# Patient Record
Sex: FEMALE | Race: WHITE | Employment: OTHER | URBAN - METROPOLITAN AREA
[De-identification: names, ages, dates, MRNs, and addresses within clinical notes are randomized per-mention and may not be internally consistent; named-entity substitution may affect disease eponyms.]

---

## 2017-02-07 ENCOUNTER — HOSPITAL ENCOUNTER (OUTPATIENT)
Dept: LAB | Age: 77
Discharge: HOME | End: 2017-02-07

## 2017-03-23 ENCOUNTER — HOSPITAL ENCOUNTER (OUTPATIENT)
Dept: LAB | Age: 77
Discharge: HOME | End: 2017-03-23

## 2020-07-31 ENCOUNTER — ED HISTORICAL/CONVERTED ENCOUNTER (OUTPATIENT)
Dept: OTHER | Age: 80
End: 2020-07-31

## 2021-05-21 ENCOUNTER — APPOINTMENT (OUTPATIENT)
Dept: CT IMAGING | Age: 81
End: 2021-05-21
Attending: EMERGENCY MEDICINE
Payer: MEDICARE

## 2021-05-21 ENCOUNTER — APPOINTMENT (OUTPATIENT)
Dept: GENERAL RADIOLOGY | Age: 81
End: 2021-05-21
Attending: EMERGENCY MEDICINE
Payer: MEDICARE

## 2021-05-21 ENCOUNTER — APPOINTMENT (OUTPATIENT)
Dept: CT IMAGING | Age: 81
DRG: 951 | End: 2021-05-21
Attending: PHYSICIAN ASSISTANT
Payer: MEDICARE

## 2021-05-21 ENCOUNTER — HOSPITAL ENCOUNTER (INPATIENT)
Age: 81
LOS: 1 days | Discharge: HOME | DRG: 951 | End: 2021-05-21
Attending: FAMILY MEDICINE | Admitting: FAMILY MEDICINE
Payer: MEDICARE

## 2021-05-21 ENCOUNTER — HOSPITAL ENCOUNTER (EMERGENCY)
Age: 81
Discharge: ANOTHER HOSPITAL | End: 2021-05-21
Attending: EMERGENCY MEDICINE
Payer: MEDICARE

## 2021-05-21 VITALS
SYSTOLIC BLOOD PRESSURE: 133 MMHG | WEIGHT: 167 LBS | HEART RATE: 75 BPM | HEIGHT: 61 IN | DIASTOLIC BLOOD PRESSURE: 50 MMHG | TEMPERATURE: 98.4 F | RESPIRATION RATE: 18 BRPM | BODY MASS INDEX: 31.53 KG/M2 | OXYGEN SATURATION: 95 %

## 2021-05-21 VITALS
DIASTOLIC BLOOD PRESSURE: 52 MMHG | RESPIRATION RATE: 18 BRPM | HEART RATE: 85 BPM | OXYGEN SATURATION: 95 % | SYSTOLIC BLOOD PRESSURE: 120 MMHG | TEMPERATURE: 98 F

## 2021-05-21 DIAGNOSIS — S50.11XA TRAUMATIC HEMATOMA OF RIGHT FOREARM, INITIAL ENCOUNTER: Primary | ICD-10-CM

## 2021-05-21 DIAGNOSIS — S06.5X0A SUBDURAL HEMATOMA WITHOUT COMA, WITHOUT LOSS OF CONSCIOUSNESS, INITIAL ENCOUNTER (HCC): ICD-10-CM

## 2021-05-21 DIAGNOSIS — S70.01XA HEMATOMA OF RIGHT HIP, INITIAL ENCOUNTER: ICD-10-CM
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
## 2021-05-21 PROBLEM — I62.00 SUBDURAL BLEEDING (HCC): Status: ACTIVE | Noted: 2021-05-21

## 2021-05-21 LAB
ALBUMIN SERPL-MCNC: 3.1 G/DL (ref 3.5–5)
ALBUMIN/GLOB SERPL: 0.8 {RATIO} (ref 1.1–2.2)
ALP SERPL-CCNC: 100 U/L (ref 45–117)
ALT SERPL-CCNC: 25 U/L (ref 12–78)
ANION GAP SERPL CALC-SCNC: 9 MMOL/L (ref 5–15)
APTT PPP: 22.9 SEC (ref 22.1–31)
AST SERPL W P-5'-P-CCNC: 18 U/L (ref 15–37)
BASOPHILS # BLD: 0 K/UL (ref 0–0.1)
BASOPHILS NFR BLD: 0 % (ref 0–1)
BILIRUB SERPL-MCNC: 0.1 MG/DL (ref 0.2–1)
BUN SERPL-MCNC: 30 MG/DL (ref 6–20)
BUN/CREAT SERPL: 25 (ref 12–20)
CA-I BLD-MCNC: 8.8 MG/DL (ref 8.5–10.1)
CHLORIDE SERPL-SCNC: 102 MMOL/L (ref 97–108)
CO2 SERPL-SCNC: 29 MMOL/L (ref 21–32)
CREAT SERPL-MCNC: 1.22 MG/DL (ref 0.55–1.02)
DIFFERENTIAL METHOD BLD: ABNORMAL
EOSINOPHIL # BLD: 0 K/UL (ref 0–0.4)
EOSINOPHIL NFR BLD: 0 % (ref 0–7)
ERYTHROCYTE [DISTWIDTH] IN BLOOD BY AUTOMATED COUNT: 13.9 % (ref 11.5–14.5)
GLOBULIN SER CALC-MCNC: 3.9 G/DL (ref 2–4)
GLUCOSE SERPL-MCNC: 134 MG/DL (ref 65–100)
HCT VFR BLD AUTO: 31.6 % (ref 35–47)
HGB BLD-MCNC: 10.3 G/DL (ref 11.5–16)
IMM GRANULOCYTES # BLD AUTO: 0.6 K/UL (ref 0–0.04)
IMM GRANULOCYTES NFR BLD AUTO: 3 % (ref 0–0.5)
INR PPP: 1 (ref 0.9–1.1)
LYMPHOCYTES # BLD: 1.6 K/UL (ref 0.8–3.5)
LYMPHOCYTES NFR BLD: 8 % (ref 12–49)
MCH RBC QN AUTO: 30 PG (ref 26–34)
MCHC RBC AUTO-ENTMCNC: 32.6 G/DL (ref 30–36.5)
MCV RBC AUTO: 92.1 FL (ref 80–99)
MONOCYTES # BLD: 0.9 K/UL (ref 0–1)
MONOCYTES NFR BLD: 5 % (ref 5–13)
NEUTS SEG # BLD: 17.2 K/UL (ref 1.8–8)
NEUTS SEG NFR BLD: 84 % (ref 32–75)
PLATELET # BLD AUTO: 285 K/UL (ref 150–400)
PMV BLD AUTO: 10.1 FL (ref 8.9–12.9)
POTASSIUM SERPL-SCNC: 4 MMOL/L (ref 3.5–5.1)
PROT SERPL-MCNC: 7 G/DL (ref 6.4–8.2)
PROTHROMBIN TIME: 10.2 SEC (ref 9–11.1)
RBC # BLD AUTO: 3.43 M/UL (ref 3.8–5.2)
SODIUM SERPL-SCNC: 140 MMOL/L (ref 136–145)
THERAPEUTIC RANGE,PTTT: NORMAL SEC (ref 82–109)
WBC # BLD AUTO: 20.3 K/UL (ref 3.6–11)

## 2021-05-21 PROCEDURE — 99284 EMERGENCY DEPT VISIT MOD MDM: CPT

## 2021-05-21 PROCEDURE — 97161 PT EVAL LOW COMPLEX 20 MIN: CPT

## 2021-05-21 PROCEDURE — 77030012341 HC CHMB SPCR OPTC MDI VYRM -A

## 2021-05-21 PROCEDURE — 97165 OT EVAL LOW COMPLEX 30 MIN: CPT

## 2021-05-21 PROCEDURE — 72125 CT NECK SPINE W/O DYE: CPT

## 2021-05-21 PROCEDURE — 74011636637 HC RX REV CODE- 636/637: Performed by: FAMILY MEDICINE

## 2021-05-21 PROCEDURE — 70450 CT HEAD/BRAIN W/O DYE: CPT

## 2021-05-21 PROCEDURE — 80053 COMPREHEN METABOLIC PANEL: CPT

## 2021-05-21 PROCEDURE — 74011000250 HC RX REV CODE- 250: Performed by: FAMILY MEDICINE

## 2021-05-21 PROCEDURE — 97530 THERAPEUTIC ACTIVITIES: CPT

## 2021-05-21 PROCEDURE — 65660000000 HC RM CCU STEPDOWN

## 2021-05-21 PROCEDURE — 85025 COMPLETE CBC W/AUTO DIFF WBC: CPT

## 2021-05-21 PROCEDURE — 36415 COLL VENOUS BLD VENIPUNCTURE: CPT

## 2021-05-21 PROCEDURE — 85610 PROTHROMBIN TIME: CPT

## 2021-05-21 PROCEDURE — 94640 AIRWAY INHALATION TREATMENT: CPT

## 2021-05-21 PROCEDURE — 73502 X-RAY EXAM HIP UNI 2-3 VIEWS: CPT

## 2021-05-21 PROCEDURE — 74011250637 HC RX REV CODE- 250/637: Performed by: FAMILY MEDICINE

## 2021-05-21 PROCEDURE — 73090 X-RAY EXAM OF FOREARM: CPT

## 2021-05-21 PROCEDURE — 85730 THROMBOPLASTIN TIME PARTIAL: CPT

## 2021-05-21 PROCEDURE — 97116 GAIT TRAINING THERAPY: CPT

## 2021-05-21 RX ORDER — HYDROCHLOROTHIAZIDE 25 MG/1
25 TABLET ORAL
Status: DISCONTINUED | OUTPATIENT
Start: 2021-05-21 | End: 2021-05-21 | Stop reason: HOSPADM

## 2021-05-21 RX ORDER — ALBUTEROL SULFATE 90 UG/1
2 AEROSOL, METERED RESPIRATORY (INHALATION)
Status: DISCONTINUED | OUTPATIENT
Start: 2021-05-21 | End: 2021-05-21 | Stop reason: CLARIF

## 2021-05-21 RX ORDER — ARFORMOTEROL TARTRATE 15 UG/2ML
15 SOLUTION RESPIRATORY (INHALATION)
Status: DISCONTINUED | OUTPATIENT
Start: 2021-05-21 | End: 2021-05-21 | Stop reason: HOSPADM

## 2021-05-21 RX ORDER — DOCUSATE SODIUM 100 MG/1
200 CAPSULE, LIQUID FILLED ORAL
Status: DISCONTINUED | OUTPATIENT
Start: 2021-05-21 | End: 2021-05-21 | Stop reason: HOSPADM

## 2021-05-21 RX ORDER — ALBUTEROL SULFATE 0.83 MG/ML
2.5 SOLUTION RESPIRATORY (INHALATION)
Status: DISCONTINUED | OUTPATIENT
Start: 2021-05-21 | End: 2021-05-21 | Stop reason: HOSPADM

## 2021-05-21 RX ORDER — BUDESONIDE 0.5 MG/2ML
500 INHALANT ORAL
Status: DISCONTINUED | OUTPATIENT
Start: 2021-05-21 | End: 2021-05-21 | Stop reason: HOSPADM

## 2021-05-21 RX ORDER — FLUTICASONE PROPIONATE 50 MCG
2 SPRAY, SUSPENSION (ML) NASAL 2 TIMES DAILY
Status: DISCONTINUED | OUTPATIENT
Start: 2021-05-21 | End: 2021-05-21 | Stop reason: HOSPADM

## 2021-05-21 RX ORDER — PREDNISONE 20 MG/1
40 TABLET ORAL
Status: DISCONTINUED | OUTPATIENT
Start: 2021-05-21 | End: 2021-05-21 | Stop reason: HOSPADM

## 2021-05-21 RX ORDER — GABAPENTIN 300 MG/1
300 CAPSULE ORAL
Status: DISCONTINUED | OUTPATIENT
Start: 2021-05-21 | End: 2021-05-21 | Stop reason: HOSPADM

## 2021-05-21 RX ORDER — FERROUS SULFATE, DRIED 160(50) MG
1 TABLET, EXTENDED RELEASE ORAL 2 TIMES DAILY WITH MEALS
Status: DISCONTINUED | OUTPATIENT
Start: 2021-05-21 | End: 2021-05-21 | Stop reason: HOSPADM

## 2021-05-21 RX ORDER — BENZONATATE 100 MG/1
200 CAPSULE ORAL
Status: DISCONTINUED | OUTPATIENT
Start: 2021-05-21 | End: 2021-05-21 | Stop reason: HOSPADM

## 2021-05-21 RX ORDER — LOSARTAN POTASSIUM 50 MG/1
100 TABLET ORAL
Status: DISCONTINUED | OUTPATIENT
Start: 2021-05-21 | End: 2021-05-21 | Stop reason: HOSPADM

## 2021-05-21 RX ORDER — IPRATROPIUM BROMIDE 0.5 MG/2.5ML
0.5 SOLUTION RESPIRATORY (INHALATION)
Status: DISCONTINUED | OUTPATIENT
Start: 2021-05-21 | End: 2021-05-21 | Stop reason: HOSPADM

## 2021-05-21 RX ORDER — PANTOPRAZOLE SODIUM 40 MG/1
40 TABLET, DELAYED RELEASE ORAL
Status: DISCONTINUED | OUTPATIENT
Start: 2021-05-21 | End: 2021-05-21 | Stop reason: HOSPADM

## 2021-05-21 RX ADMIN — GABAPENTIN 300 MG: 300 CAPSULE ORAL at 06:07

## 2021-05-21 RX ADMIN — IPRATROPIUM BROMIDE 0.5 MG: 0.5 SOLUTION RESPIRATORY (INHALATION) at 08:31

## 2021-05-21 RX ADMIN — FLUTICASONE PROPIONATE 2 SPRAY: 50 SPRAY, METERED NASAL at 14:24

## 2021-05-21 RX ADMIN — CALCIUM CARBONATE-VITAMIN D TAB 500 MG-200 UNIT 1 TABLET: 500-200 TAB at 08:49

## 2021-05-21 RX ADMIN — ARFORMOTEROL TARTRATE 15 MCG: 15 SOLUTION RESPIRATORY (INHALATION) at 08:31

## 2021-05-21 RX ADMIN — PREDNISONE 40 MG: 20 TABLET ORAL at 08:49

## 2021-05-21 RX ADMIN — PANTOPRAZOLE SODIUM 40 MG: 40 TABLET, DELAYED RELEASE ORAL at 06:07

## 2021-05-21 RX ADMIN — BUDESONIDE 500 MCG: 0.5 INHALANT RESPIRATORY (INHALATION) at 08:31

## 2021-05-21 NOTE — ED NOTES
Patient transported to [facility] via [facility]. Report, EMTALA, and copy of the chart given to paramedic. Patient displays no signs or symptoms of distress at time of leaving this facility.

## 2021-05-21 NOTE — H&P
Hospitalist Admission Note    NAME: Janel Terrell   :  1940   MRN:  [id number]     Date/Time:  2021 6:36 AM    Patient PCP: Uriel Smith MD  ________________________________________________________________________    My assessment of this patient's clinical condition and my plan of care is as follows. Assessment / Plan:  Patient 41-year-old female admitted for traumatic subdural hematoma  1. Subdural hematoma; patient neurologically intact, alert orientedx4, CT scan of head showsQuestion subtle anterior right parafalcine subdural hematoma. Otherwise, noadditional areas of acute intracranial hemorrhage or mass effect suspected. Neurosurgery consulted, advised admission repeat CT scan there is no indication for acute surgical intervention. 2.  History of asthma bronchiectasis recently diagnosed with bronchitis. On prednisone. We will continue prednisone we will continue inhalers. No respiratory distress at rest.  Minimal expiratory wheezing. 3.  History of lower extremity neuropathy: Continue gabapentin  4. Retention: Continue home medication  5. History of reflux; continue Protonix  History of Mycobacterium avium, no active infection, recently had follow-up with pulmonologist    Code Status: Full code  Surrogate Decision Maker:    DVT Prophylaxis: SCD  GI Prophylaxis: not indicated    Baseline: Independent lifestyle        Subjective:   CHIEF COMPLAINT: Fall, referral from [facility] emergency room for subdural hematoma    HISTORY OF PRESENT ILLNESS:     Geetha Peralta is a [de-identified] y.o.  female who presents with above symptoms.   Patient reports had a ground-level fall on her right side, with large bruise on right elbow and right hip, reports she was conscious during the whole process, reports she did not remember hitting her head, she went to emergency room because she had a bruise on right hip, and was concerned about hip fracture, reports she was able to ambulate after fall, denies chest pain shortness of breath dizziness prior to fall, denies recurrent fall. Patient reports has history of asthma and bronchiectasis, never been prescribed prednisone tapering dose. Symptoms improving. Denies shortness of breath at rest  Upon arrival in the emergency room CT scan of cervical spine shows no acute fracture dislocation, CT scan of head shows questionable anterior right parafalcine  subdural hematoma, consulted neurosurgery on-call advised admission for further evaluation repeat CT .     Past Medical History:   Diagnosis Date    Arthritis     hands    Asthma     Bronchiectasis (HCC)     Chronic pain     back (pt denies)    GERD (gastroesophageal reflux disease)     Hypertension     MAC (mycobacterium avium-intracellulare complex)     Other ill-defined conditions(799.89)     MAC- mycobacterium avium complex    Other ill-defined conditions(799.89)     restless leg syndrome    Other ill-defined conditions(799.89)     bronchiectasis    Psychiatric disorder     anxiety    Unspecified sleep apnea     wears CPAP at night        Past Surgical History:   Procedure Laterality Date    HX APPENDECTOMY      HX CHOLECYSTECTOMY      HX HYSTERECTOMY  2002    HX MOHS PROCEDURES Right     HX ORTHOPAEDIC Left 2002    hemiarthrectomy    HX ORTHOPAEDIC Right     bunionectomy, hammer toe repair    HX ORTHOPAEDIC Left     3 shoulder surgeries - last was a reverse shoulder replacement    HX TONSILLECTOMY      [place] BREAST SURGERY PROCEDURE UNLISTED      breast reductions       Social History     Tobacco Use    Smoking status: Never Smoker    Smokeless tobacco: Never Used   Substance Use Topics    Alcohol use: No        Family History   Problem Relation Age of Onset    Stroke Mother     Lung Disease Father     Cancer Sister         bladder    Arthritis-osteo Sister     Arthritis-osteo Sister     Hypertension Sister     Heart Disease Sister     Other Brother         dies with complications with cholecystectomy     Allergies   Allergen Reactions    Latex Other (comments)     Flu like symptoms    Clindamycin Nausea Only    Darvocet A500 [Propoxyphene N-Acetaminophen] Unknown (comments)     Not sure patient states it was on her 'chart'    Penicillins Rash     Not really sure patient had bruises at the time    Reglan [Metoclopramide] Other (comments)     Made patient feel like a 'zombie'        Prior to Admission medications    Medication Sig Start Date End Date Taking? Authorizing Provider   oxyCODONE-acetaminophen (PERCOCET) 5-325 mg per tablet Take 1 Tab by mouth every four (4) hours as needed for Pain. Max Daily Amount: 6 Tabs. 4/4/16   Pamela Britton MD   ondansetron (ZOFRAN ODT) 4 mg disintegrating tablet Take 1 Tab by mouth every eight (8) hours as needed for Nausea. 4/4/16   Pamela Britton MD   polyethylene glycol (MIRALAX) 17 gram/dose powder Take 17 g by mouth daily for 30 days. 2/23/15 3/25/15  Sandy Estrada MD   arformoterol (BROVANA) 15 mcg/2 mL nebu neb solution 15 mcg by Nebulization route two (2) times a day. Provider, Historical   budesonide (PULMICORT) 0.5 mg/2 mL nebulizer suspension 500 mcg by Nebulization route two (2) times a day. Provider, Historical   ipratropium (ATROVENT) 0.02 % nebulizer solution by Nebulization route two (2) times a day. Can use every 4 hrs as needed but does use it twice daily. Provider, Historical   lansoprazole (PREVACID) 30 mg capsule Take 30 mg by mouth two (2) times a day. Provider, Historical   potassium chloride (KLOR-CON M20) 20 mEq tablet Take 40 mEq by mouth two (2) times a day. Provider, Historical   montelukast (SINGULAIR) 10 mg tablet Take 10 mg by mouth daily. Provider, Historical   tiotropium (SPIRIVA WITH HANDIHALER) 18 mcg inhalation capsule Take 1 Cap by inhalation daily. Provider, Historical   VENLAFAXINE HCL (EFFEXOR XR PO) Take 150 mg by mouth daily.     Provider, Historical   rOPINIRole (REQUIP) 0.5 mg tablet Take  by mouth. 1 tab in am, one tab in the middle of the day, and 3 tabs at night. Provider, Historical   VERAPAMIL HCL (VERAPAMIL PO) Take 180 mg by mouth daily. Provider, Historical   traZODone (DESYREL) 100 mg tablet Take 100 mg by mouth nightly. Provider, Historical   albuterol (PROAIR HFA) 90 mcg/actuation inhaler Take 2 Puffs by inhalation every four (4) hours as needed for Wheezing. Provider, Historical   losartan-hydrochlorothiazide (HYZAAR) 100-25 mg per tablet Take 1 Tab by mouth nightly. Provider, Historical   cetirizine (ZYRTEC) 10 mg tablet Take 10 mg by mouth daily. Provider, Historical   gabapentin (NEURONTIN) 300 mg capsule Take 300 mg by mouth nightly. Provider, Historical   furosemide (LASIX) 40 mg tablet Take 40 mg by mouth daily as needed. Provider, Historical   fluticasone (FLONASE) 50 mcg/actuation nasal spray 2 Sprays by Both Nostrils route two (2) times a day. Provider, Historical   benzonatate (TESSALON) 200 mg capsule Take 200 mg by mouth three (3) times daily as needed for Cough. Provider, Historical   cephALEXin (KEFLEX) 500 mg capsule Take  by mouth. 4  prior to dental appt. Provider, Historical   docusate sodium (STOOL SOFTENER) 100 mg capsule Take 200 mg by mouth nightly. Provider, Historical   calcium citrate-vitamin d3 (CITRACAL + D) 315-200 mg-unit tab Take 2 Tabs by mouth two (2) times daily (with meals). Provider, Historical       REVIEW OF SYSTEMS:     I am not able to complete the review of systems because:    The patient is intubated and sedated    The patient has altered mental status due to his acute medical problems    The patient has baseline aphasia from prior stroke(s)    The patient has baseline dementia and is not reliable historian    The patient is in acute medical distress and unable to provide information           Total of 12 systems reviewed as follows:       POSITIVE= underlined text  Negative = text not underlined  General:  fever, chills, sweats, generalized weakness, weight loss/gain,      loss of appetite   Eyes:    blurred vision, eye pain, loss of vision, double vision  ENT:    rhinorrhea, pharyngitis   Respiratory:   cough, sputum production, SOB, ROPER, wheezing, pleuritic pain   Cardiology:   chest pain, palpitations, orthopnea, PND, edema, syncope   Gastrointestinal:  abdominal pain , N/V, diarrhea, dysphagia, constipation, bleeding   Genitourinary:  frequency, urgency, dysuria, hematuria, incontinence   Muskuloskeletal : Right elbow right hip pain  Hematology:  easy bruising, nose or gum bleeding, lymphadenopathy   Dermatological: Right elbow, right hip, multiple bruises,   Endocrine:   hot flashes or polydipsia   Neurological:  headache, dizziness, confusion, focal weakness, paresthesia,     Speech difficulties, memory loss, gait difficulty  Psychological: Feelings of anxiety, depression, agitation    Objective:   VITALS:    Visit Vitals  /60 (BP 1 Location: Left arm)   Pulse 84   Temp 97.7 °F (36.5 °C)   Resp 16   SpO2 95%       PHYSICAL EXAM:    General:    Alert, cooperative, no distress, appears stated age. HEENT: A small bruise on chin, anicteric sclerae, pink conjunctivae     No oral ulcers, mucosa moist, throat clear, dentition fair  Neck:  Supple, symmetrical,  thyroid: non tender  Lungs:   Clear to auscultation bilaterally. No Wheezing or Rhonchi. No rales. Chest wall:  No tenderness  No Accessory muscle use. Heart:   Regular  rhythm,  No  murmur   No edema  Abdomen:   Soft, non-tender. Not distended. Bowel sounds normal  Extremities: Bruises on right elbow and right hip    Capillary refill normal,  Radial pulse 2+,  DP. Skin:     Not pale. Not Jaundiced  No rashes   Psych:  Good insight. Not depressed. Not anxious or agitated. Neurologic: EOMs intact. No facial asymmetry. No aphasia or slurred speech. Symmetrical strength, Sensation grossly intact. Alert and oriented X 4. _______________________________________________________________________  Care Plan discussed with:    Comments   Patient     Family      RN     Care Manager                    Consultant:      _______________________________________________________________________  Expected  Disposition:   Home with Family    HH/PT/OT/RN    SNF/LTC    CASTRO    ________________________________________________________________________  TOTAL TIME: 45Minutes    Critical Care Provided     Minutes non procedure based      Comments   >50% of visit spent in counseling and coordination of care  Chart review  Discussion with patient and/or family and questions answered     ________________________________________________________________________  Quintin Hoyos MD    Procedures: see electronic medical records for all procedures/Xrays and details which were not copied into this note but were reviewed prior to creation of Plan. LAB DATA REVIEWED:    Recent Results (from the past 24 hour(s))   CBC WITH AUTOMATED DIFF    Collection Time: 05/21/21  2:00 AM   Result Value Ref Range    WBC 20.3 (H) 3.6 - 11.0 K/uL    RBC 3.43 (L) 3.80 - 5.20 M/uL    HGB 10.3 (L) 11.5 - 16.0 g/dL    HCT 31.6 (L) 35.0 - 47.0 %    MCV 92.1 80.0 - 99.0 FL    MCH 30.0 26.0 - 34.0 PG    MCHC 32.6 30.0 - 36.5 g/dL    RDW 13.9 11.5 - 14.5 %    PLATELET 980 150 - 202 K/uL    MPV 10.1 8.9 - 12.9 FL    NEUTROPHILS 84 (H) 32 - 75 %    LYMPHOCYTES 8 (L) 12 - 49 %    MONOCYTES 5 5 - 13 %    EOSINOPHILS 0 0 - 7 %    BASOPHILS 0 0 - 1 %    IMMATURE GRANULOCYTES 3 (H) 0.0 - 0.5 %    ABS. NEUTROPHILS 17.2 (H) 1.8 - 8.0 K/UL    ABS. LYMPHOCYTES 1.6 0.8 - 3.5 K/UL    ABS. MONOCYTES 0.9 0.0 - 1.0 K/UL    ABS. EOSINOPHILS 0.0 0.0 - 0.4 K/UL    ABS. BASOPHILS 0.0 0.0 - 0.1 K/UL    ABS. IMM.  GRANS. 0.6 (H) 0.00 - 0.04 K/UL    DF AUTOMATED     PROTHROMBIN TIME + INR    Collection Time: 05/21/21  2:00 AM   Result Value Ref Range    Prothrombin time 10.2 9.0 - 11.1 sec    INR 1.0 0.9 - 1. 1     PTT    Collection Time: 05/21/21  2:00 AM   Result Value Ref Range    aPTT 22.9 22.1 - 31.0 sec    aPTT, therapeutic range   82 - 394 sec   METABOLIC PANEL, COMPREHENSIVE    Collection Time: 05/21/21  2:00 AM   Result Value Ref Range    Sodium 140 136 - 145 mmol/L    Potassium 4.0 3.5 - 5.1 mmol/L    Chloride 102 97 - 108 mmol/L    CO2 29 21 - 32 mmol/L    Anion gap 9 5 - 15 mmol/L    Glucose 134 (H) 65 - 100 mg/dL    BUN 30 (H) 6 - 20 mg/dL    Creatinine 1.22 (H) 0.55 - 1.02 mg/dL    BUN/Creatinine ratio 25 (H) 12 - 20      GFR est AA 51 (L) >60 ml/min/1.73m2    GFR est non-AA 42 (L) >60 ml/min/1.73m2    Calcium 8.8 8.5 - 10.1 mg/dL    Bilirubin, total 0.1 (L) 0.2 - 1.0 mg/dL    AST (SGOT) 18 15 - 37 U/L    ALT (SGPT) 25 12 - 78 U/L    Alk.  phosphatase 100 45 - 117 U/L    Protein, total 7.0 6.4 - 8.2 g/dL    Albumin 3.1 (L) 3.5 - 5.0 g/dL    Globulin 3.9 2.0 - 4.0 g/dL    A-G Ratio 0.8 (L) 1.1 - 2.2

## 2021-05-21 NOTE — PROGRESS NOTES
Hospital follow-up PCP transitional care appointment has been scheduled with Dr. Antonio Dale for Tuesday, 6/8/21 at 12:40 p.m. Pending patient discharge.   Santino Cunningham, Care Management Specialist.

## 2021-05-21 NOTE — PROGRESS NOTES
Repeat head CT shows \"No acute intracranial hemorrhage, mass or infarct\". No neurosurgical needs at this time. Will sign off. Please re-consult if needed. Thank you.     SARAH Montenegro   Neurosurgery

## 2021-05-21 NOTE — ED PROVIDER NOTES
EMERGENCY DEPARTMENT HISTORY AND PHYSICAL EXAM      Date: 5/21/2021  Patient Name: Fidelina Mandel    History of Presenting Illness     Chief Complaint   Patient presents with    Fall       History Provided By: Patient    HPI: Fidelina Mandel, [de-identified] y.o. female with a past medical history significant for hypertension, osteoarthritis, asthma and Sleep apnea, GERD, psychiatric disorder, chronic pain, bronchiectasis, Mycobacterium avium-intracellulare complex (MAC) presents to the ED with cc of fall, who presents for complaints of right hip and right forearm pain and right side of chin with hematoma formation. She states she did not pass out, but developed significant swelling of her right hip and her right forearm at site of injury. She also complains of a mild headache and mild neck pain. No open lacerations reported or other injuries. There are no other complaints, changes, or physical findings at this time. PCP: Gloria Martinez MD    No current facility-administered medications on file prior to encounter. Current Outpatient Medications on File Prior to Encounter   Medication Sig Dispense Refill    oxyCODONE-acetaminophen (PERCOCET) 5-325 mg per tablet Take 1 Tab by mouth every four (4) hours as needed for Pain. Max Daily Amount: 6 Tabs. 20 Tab 0    ondansetron (ZOFRAN ODT) 4 mg disintegrating tablet Take 1 Tab by mouth every eight (8) hours as needed for Nausea. 20 Tab 0    polyethylene glycol (MIRALAX) 17 gram/dose powder Take 17 g by mouth daily for 30 days. 510 g 11    arformoterol (BROVANA) 15 mcg/2 mL nebu neb solution 15 mcg by Nebulization route two (2) times a day.  budesonide (PULMICORT) 0.5 mg/2 mL nebulizer suspension 500 mcg by Nebulization route two (2) times a day.  ipratropium (ATROVENT) 0.02 % nebulizer solution by Nebulization route two (2) times a day. Can use every 4 hrs as needed but does use it twice daily.       lansoprazole (PREVACID) 30 mg capsule Take 30 mg by mouth two (2) times a day.  potassium chloride (KLOR-CON M20) 20 mEq tablet Take 40 mEq by mouth two (2) times a day.  montelukast (SINGULAIR) 10 mg tablet Take 10 mg by mouth daily.  tiotropium (SPIRIVA WITH HANDIHALER) 18 mcg inhalation capsule Take 1 Cap by inhalation daily.  VENLAFAXINE HCL (EFFEXOR XR PO) Take 150 mg by mouth daily.  rOPINIRole (REQUIP) 0.5 mg tablet Take  by mouth. 1 tab in am, one tab in the middle of the day, and 3 tabs at night.  VERAPAMIL HCL (VERAPAMIL PO) Take 180 mg by mouth daily.  traZODone (DESYREL) 100 mg tablet Take 100 mg by mouth nightly.  albuterol (PROAIR HFA) 90 mcg/actuation inhaler Take 2 Puffs by inhalation every four (4) hours as needed for Wheezing.  losartan-hydrochlorothiazide (HYZAAR) 100-25 mg per tablet Take 1 Tab by mouth nightly.  cetirizine (ZYRTEC) 10 mg tablet Take 10 mg by mouth daily.  gabapentin (NEURONTIN) 300 mg capsule Take 300 mg by mouth nightly.  furosemide (LASIX) 40 mg tablet Take 40 mg by mouth daily as needed.  fluticasone (FLONASE) 50 mcg/actuation nasal spray 2 Sprays by Both Nostrils route two (2) times a day.  benzonatate (TESSALON) 200 mg capsule Take 200 mg by mouth three (3) times daily as needed for Cough.  cephALEXin (KEFLEX) 500 mg capsule Take  by mouth. 4  prior to dental appt.  docusate sodium (STOOL SOFTENER) 100 mg capsule Take 200 mg by mouth nightly.  calcium citrate-vitamin d3 (CITRACAL + D) 315-200 mg-unit tab Take 2 Tabs by mouth two (2) times daily (with meals).          Past History     Past Medical History:  Past Medical History:   Diagnosis Date    Arthritis     hands    Asthma     Bronchiectasis (HCC)     Chronic pain     back (pt denies)    GERD (gastroesophageal reflux disease)     Hypertension     MAC (mycobacterium avium-intracellulare complex)     Other ill-defined conditions(799.89)     MAC- mycobacterium avium complex    Other ill-defined conditions(799.89)     restless leg syndrome    Other ill-defined conditions(799.89)     bronchiectasis    Psychiatric disorder     anxiety    Unspecified sleep apnea     wears CPAP at night       Past Surgical History:  Past Surgical History:   Procedure Laterality Date    HX APPENDECTOMY      HX CHOLECYSTECTOMY      HX HYSTERECTOMY  2002    HX MOHS PROCEDURES Right     HX ORTHOPAEDIC Left 2002    hemiarthrectomy    HX ORTHOPAEDIC Right     bunionectomy, hammer toe repair    HX ORTHOPAEDIC Left     3 shoulder surgeries - last was a reverse shoulder replacement    HX TONSILLECTOMY      [place] BREAST SURGERY PROCEDURE UNLISTED      breast reductions       Family History:  Family History   Problem Relation Age of Onset    Stroke Mother     Lung Disease Father     Cancer Sister         bladder    Arthritis-osteo Sister    Dhiraj Clementon Sister     Hypertension Sister     Heart Disease Sister     Other Brother         dies with complications with cholecystectomy       Social History:  Social History     Tobacco Use    Smoking status: Never Smoker    Smokeless tobacco: Never Used   Substance Use Topics    Alcohol use: No    Drug use: No       Allergies: Allergies   Allergen Reactions    Latex Other (comments)     Flu like symptoms    Clindamycin Nausea Only    Darvocet A500 [Propoxyphene N-Acetaminophen] Unknown (comments)     Not sure patient states it was on her 'chart'    Penicillins Rash     Not really sure patient had bruises at the time    Reglan [Metoclopramide] Other (comments)     Made patient feel like a 'zombie'         Review of Systems     Review of Systems   Constitutional: Negative for diaphoresis and fatigue. HENT: Negative for congestion, dental problem, ear discharge and ear pain. Contusion on chin   Eyes: Negative for discharge and redness. Respiratory: Negative for cough, chest tightness and shortness of breath.     Cardiovascular: Negative for chest pain and palpitations. Gastrointestinal: Negative for abdominal pain, constipation, diarrhea, nausea and vomiting. Endocrine: Negative. Genitourinary: Negative. Negative for dysuria and frequency. Musculoskeletal: Negative for arthralgias and myalgias. As in HPI   Skin: Negative. Neurological: Positive for headaches. Negative for dizziness, syncope and light-headedness. Hematological: Negative. Psychiatric/Behavioral: Negative for agitation and behavioral problems. All other systems reviewed and are negative. Physical Exam     Physical Exam  Vitals and nursing note reviewed. Constitutional:       Appearance: Normal appearance. She is normal weight. HENT:      Head: Normocephalic and atraumatic. Nose: Nose normal.      Mouth/Throat:      Mouth: Mucous membranes are moist.      Pharynx: Oropharynx is clear. Eyes:      Extraocular Movements: Extraocular movements intact. Conjunctiva/sclera: Conjunctivae normal.      Pupils: Pupils are equal, round, and reactive to light. Cardiovascular:      Rate and Rhythm: Normal rate and regular rhythm. Pulses: Normal pulses. Heart sounds: Normal heart sounds. Pulmonary:      Effort: Pulmonary effort is normal.      Breath sounds: Normal breath sounds. Abdominal:      General: Abdomen is flat. Palpations: Abdomen is soft. Musculoskeletal:         General: Normal range of motion. Cervical back: Normal range of motion and neck supple. Skin:     General: Skin is warm and dry. Capillary Refill: Capillary refill takes less than 2 seconds. Findings: Bruising present. Comments: Right forearm and right hip   Neurological:      General: No focal deficit present. Mental Status: She is alert and oriented to person, place, and time.    Psychiatric:         Mood and Affect: Mood normal.         Behavior: Behavior normal.         Lab and Diagnostic Study Results     Labs -     Recent Results (from the past 12 hour(s))   CBC WITH AUTOMATED DIFF    Collection Time: 05/21/21  2:00 AM   Result Value Ref Range    WBC 20.3 (H) 3.6 - 11.0 K/uL    RBC 3.43 (L) 3.80 - 5.20 M/uL    HGB 10.3 (L) 11.5 - 16.0 g/dL    HCT 31.6 (L) 35.0 - 47.0 %    MCV 92.1 80.0 - 99.0 FL    MCH 30.0 26.0 - 34.0 PG    MCHC 32.6 30.0 - 36.5 g/dL    RDW 13.9 11.5 - 14.5 %    PLATELET 319 511 - 941 K/uL    MPV 10.1 8.9 - 12.9 FL    NEUTROPHILS 84 (H) 32 - 75 %    LYMPHOCYTES 8 (L) 12 - 49 %    MONOCYTES 5 5 - 13 %    EOSINOPHILS 0 0 - 7 %    BASOPHILS 0 0 - 1 %    IMMATURE GRANULOCYTES 3 (H) 0.0 - 0.5 %    ABS. NEUTROPHILS 17.2 (H) 1.8 - 8.0 K/UL    ABS. LYMPHOCYTES 1.6 0.8 - 3.5 K/UL    ABS. MONOCYTES 0.9 0.0 - 1.0 K/UL    ABS. EOSINOPHILS 0.0 0.0 - 0.4 K/UL    ABS. BASOPHILS 0.0 0.0 - 0.1 K/UL    ABS. IMM. GRANS. 0.6 (H) 0.00 - 0.04 K/UL    DF AUTOMATED     PROTHROMBIN TIME + INR    Collection Time: 05/21/21  2:00 AM   Result Value Ref Range    Prothrombin time 10.2 9.0 - 11.1 sec    INR 1.0 0.9 - 1.1     PTT    Collection Time: 05/21/21  2:00 AM   Result Value Ref Range    aPTT 22.9 22.1 - 31.0 sec    aPTT, therapeutic range   82 - 260 sec   METABOLIC PANEL, COMPREHENSIVE    Collection Time: 05/21/21  2:00 AM   Result Value Ref Range    Sodium 140 136 - 145 mmol/L    Potassium 4.0 3.5 - 5.1 mmol/L    Chloride 102 97 - 108 mmol/L    CO2 29 21 - 32 mmol/L    Anion gap 9 5 - 15 mmol/L    Glucose 134 (H) 65 - 100 mg/dL    BUN 30 (H) 6 - 20 mg/dL    Creatinine 1.22 (H) 0.55 - 1.02 mg/dL    BUN/Creatinine ratio 25 (H) 12 - 20      GFR est AA 51 (L) >60 ml/min/1.73m2    GFR est non-AA 42 (L) >60 ml/min/1.73m2    Calcium 8.8 8.5 - 10.1 mg/dL    Bilirubin, total 0.1 (L) 0.2 - 1.0 mg/dL    AST (SGOT) 18 15 - 37 U/L    ALT (SGPT) 25 12 - 78 U/L    Alk.  phosphatase 100 45 - 117 U/L    Protein, total 7.0 6.4 - 8.2 g/dL    Albumin 3.1 (L) 3.5 - 5.0 g/dL    Globulin 3.9 2.0 - 4.0 g/dL    A-G Ratio 0.8 (L) 1.1 - 2.2         Radiologic Studies -     CT Results  (Last 48 hours)               05/21/21 0244  CT SPINE CERV WO CONT Final result    Impression:  No acute fracture or subluxation. Narrative:  CT OF THE CERVICAL SPINE WITHOUT INTRAVENOUS CONTRAST       INDICATION: Head injury       TECHNIQUE: Thin section transaxial images were obtained through the cervical   spine. Sagittal and coronal reformations were performed. Dose reduction: Per   department policy, all CT scans at this facility are performed using dose   reduction optimization techniques as appropriate to a performed examination   including the following: Automated exposure control, adjustments of the mA   and/or KV according to patient size, or use of iterative reconstruction   technique. COMPARISON: None       FINDINGS:       Vertebral column: Vertebral body heights are normal. Intervertebral disc height   loss and degenerative endplate changes at E8-8 and C6-7. No acute fracture or   subluxation. No aggressive osseous lesions. Significant facet arthrosis on the   left at C2-3 and C3-4. Cystic changes are likely degenerative. Soft tissues: No prevertebral soft tissue swelling. Calcified atherosclerosis in   the neck. Partially visualized changes of endoscopic sinonasal surgery. Mild polypoid   mucosal thickening of the left maxillary sinus. Odontogenic disease with dental   hardware. 05/21/21 0100  CT HEAD WO CONT Final result    Impression:  Question subtle anterior right parafalcine subdural hematoma. Otherwise, no   additional areas of acute intracranial hemorrhage or mass effect suspected. Findings discussed with Dr. Ginny Cast by Dr. Padmini Jordan on 5/21/2021 at 1:18 AM               Narrative:  CT HEAD WITHOUT IV CONTRAST       CLINICAL INDICATION: Fall, injury       TECHNIQUE: Routine axial images were obtained through the brain without the use   of IV contrast. Sagittal and coronal reformatted images were performed at the CT   console.  Dose reduction: Per department policy, all CT scans at this facility   are performed using dose reduction optimization techniques as appropriate to a   performed examination including the following: Automated exposure control,   adjustments of the mA and/or KV according to patient size, or use of iterative   reconstruction technique. COMPARISON: None. FINDINGS:    There is trace high density along the anterior falx (axial image 40, coronal   image 24) which may represent subtle right parafalcine subdural hematoma. No   significant associated mass effect. No additional areas of acute intracranial   hemorrhage or mass effect. Ventricles and extra-axial spaces are normal in size   and configuration for patient age. No hydrocephalus. Sequela of prior endoscopic   sinonasal surgery noted. Mild mucosal thickening of the left maxillary sinus,   left frontal sinus and ethmoids. Bilateral TMJ arthrosis, right greater than   left. Tympanomastoid cavities are predominantly clear. Calcified intracranial   atherosclerosis. Absent bilateral native lenses. No acute calvarial abnormality. CXR Results  (Last 48 hours)    None            Medical Decision Making   - I am the first provider for this patient. - I reviewed the vital signs, available nursing notes, past medical history, past surgical history, family history and social history. - Initial assessment performed. The patients presenting problems have been discussed, and they are in agreement with the care plan formulated and outlined with them. I have encouraged them to ask questions as they arise throughout their visit. Vital Signs-Reviewed the patient's vital signs.   Patient Vitals for the past 12 hrs:   Temp Pulse Resp BP SpO2   05/21/21 0259  75 18 (!) 133/50 95 %   05/21/21 0020 98.4 °F (36.9 °C) 83 18 (!) 158/55 97 %       Records Reviewed: Nursing Notes    ED Course/Provider Notes (Medical Decision Making):   Patient will be transferred after discussion with Dr. Cintia Steen Ronald Yang, to [facility] for further evaluation and management and neurosurgical evaluation and treatment/observation. Disposition     Disposition: Condition stable    Patient will be transferred to [facility] for further evaluation and treatment. Accepted by Dr. Suresh Reyes (hospitalist) and neurosurgeon Dr. Clementina Murphy at [id number] [facility]:  1. Current Discharge Medication List      CONTINUE these medications which have NOT CHANGED    Details   oxyCODONE-acetaminophen (PERCOCET) 5-325 mg per tablet Take 1 Tab by mouth every four (4) hours as needed for Pain. Max Daily Amount: 6 Tabs. Qty: 20 Tab, Refills: 0      ondansetron (ZOFRAN ODT) 4 mg disintegrating tablet Take 1 Tab by mouth every eight (8) hours as needed for Nausea. Qty: 20 Tab, Refills: 0      polyethylene glycol (MIRALAX) 17 gram/dose powder Take 17 g by mouth daily for 30 days. Qty: 510 g, Refills: 11      arformoterol (BROVANA) 15 mcg/2 mL nebu neb solution 15 mcg by Nebulization route two (2) times a day. budesonide (PULMICORT) 0.5 mg/2 mL nebulizer suspension 500 mcg by Nebulization route two (2) times a day. ipratropium (ATROVENT) 0.02 % nebulizer solution by Nebulization route two (2) times a day. Can use every 4 hrs as needed but does use it twice daily. lansoprazole (PREVACID) 30 mg capsule Take 30 mg by mouth two (2) times a day. potassium chloride (KLOR-CON M20) 20 mEq tablet Take 40 mEq by mouth two (2) times a day. montelukast (SINGULAIR) 10 mg tablet Take 10 mg by mouth daily. tiotropium (SPIRIVA WITH HANDIHALER) 18 mcg inhalation capsule Take 1 Cap by inhalation daily. VENLAFAXINE HCL (EFFEXOR XR PO) Take 150 mg by mouth daily. rOPINIRole (REQUIP) 0.5 mg tablet Take  by mouth. 1 tab in am, one tab in the middle of the day, and 3 tabs at night. VERAPAMIL HCL (VERAPAMIL PO) Take 180 mg by mouth daily. traZODone (DESYREL) 100 mg tablet Take 100 mg by mouth nightly.       albuterol (PROAIR HFA) 90 mcg/actuation inhaler Take 2 Puffs by inhalation every four (4) hours as needed for Wheezing. losartan-hydrochlorothiazide (HYZAAR) 100-25 mg per tablet Take 1 Tab by mouth nightly. cetirizine (ZYRTEC) 10 mg tablet Take 10 mg by mouth daily. gabapentin (NEURONTIN) 300 mg capsule Take 300 mg by mouth nightly. furosemide (LASIX) 40 mg tablet Take 40 mg by mouth daily as needed. fluticasone (FLONASE) 50 mcg/actuation nasal spray 2 Sprays by Both Nostrils route two (2) times a day. benzonatate (TESSALON) 200 mg capsule Take 200 mg by mouth three (3) times daily as needed for Cough. cephALEXin (KEFLEX) 500 mg capsule Take  by mouth. 4  prior to dental appt. docusate sodium (STOOL SOFTENER) 100 mg capsule Take 200 mg by mouth nightly. calcium citrate-vitamin d3 (CITRACAL + D) 315-200 mg-unit tab Take 2 Tabs by mouth two (2) times daily (with meals). 2.   Follow-up Information    None       3. Return to ED if worse   4. Current Discharge Medication List            Diagnosis     Clinical Impression:   1. Traumatic hematoma of right forearm, initial encounter    2. Hematoma of right hip, initial encounter    3. Subdural hematoma without coma, without loss of consciousness, initial encounter [facility])        Attestations:    Delmer Jordan MD    Please note that this dictation was completed with [employer], the computer voice recognition software. Quite often unanticipated grammatical, syntax, homophones, and other interpretive errors are inadvertently transcribed by the computer software. Please disregard these errors. Please excuse any errors that have escaped final proofreading. Thank you.

## 2021-05-21 NOTE — PROGRESS NOTES
Problem: TIA/CVA Stroke: 0-24 hours  Goal: Activity/Safety  Outcome: Progressing Towards Goal  Goal: Consults, if ordered  Outcome: Progressing Towards Goal  Goal: Diagnostic Test/Procedures  Outcome: Progressing Towards Goal  Goal: Nutrition/Diet  Outcome: Progressing Towards Goal  Goal: Respiratory  Outcome: Progressing Towards Goal

## 2021-05-21 NOTE — ED TRIAGE NOTES
Patient reports she fell at home. States she was \"putting a glass on the counter and next thing I knew I was on the floor\". Denies syncopal episode. Denies LOC. Presents with bruising and pain to right forearm, right hip, and right side of chin. Also reports headache. Reports mild neck pain.  Reports incident happened around 2300

## 2021-05-21 NOTE — PROGRESS NOTES
OCCUPATIONAL THERAPY EVALUATION/DISCHARGE  Patient: Maria Luisa Mclaughlin ([de-identified] y.o. female)  Date: 5/21/2021  Primary Diagnosis: Subdural bleeding (HCC) [I62.00]       Precautions:        ASSESSMENT  Based on the objective data described below, the patient presents with elevated HR during activity. Education completed. Current Level of Function (ADLs/self-care): independent with ADLs    Other factors to consider for discharge: spouse     PLAN :  Recommend with staff: continue toileting in bathroom and completing ADLs    Recommendation for discharge: (in order for the patient to meet his/her long term goals)  No skilled occupational therapy/ follow up rehabilitation needs identified at this time. This discharge recommendation:  Has not yet been discussed the attending provider and/or case management    IF patient discharges home will need the following DME: none       SUBJECTIVE:   Patient stated I just have this restless leg problem.     OBJECTIVE DATA SUMMARY:   HISTORY:   Past Medical History:   Diagnosis Date    Arthritis     hands    Asthma     Bronchiectasis (HCC)     Chronic pain     back (pt denies)    GERD (gastroesophageal reflux disease)     Hypertension     MAC (mycobacterium avium-intracellulare complex)     Other ill-defined conditions(799.89)     MAC- mycobacterium avium complex    Other ill-defined conditions(799.89)     restless leg syndrome    Other ill-defined conditions(799.89)     bronchiectasis    Psychiatric disorder     anxiety    Unspecified sleep apnea     wears CPAP at night     Past Surgical History:   Procedure Laterality Date    HX APPENDECTOMY      HX CHOLECYSTECTOMY      HX HYSTERECTOMY  2002    HX MOHS PROCEDURES Right     HX ORTHOPAEDIC Left 2002    hemiarthrectomy    HX ORTHOPAEDIC Right     bunionectomy, hammer toe repair    HX ORTHOPAEDIC Left     3 shoulder surgeries - last was a reverse shoulder replacement    HX TONSILLECTOMY      [place] BREAST SURGERY PROCEDURE UNLISTED      breast reductions       Prior Level of Function/Environment/Context: independent, active in her garden and crafting. \"My  tells me to slow down! \". Expanded or extensive additional review of patient history:   Home Situation  Home Environment: Private residence  # Steps to Enter: 2  One/Two Story Residence:  (one level w/ basement where pt crafts)  Living Alone: No  Support Systems: Spouse/Significant Other/Partner  Patient Expects to be Discharged to[de-identified] Private residence  Current DME Used/Available at Home: Ev Muscat, straight, Wheelchair    Hand dominance: Right    EXAMINATION OF PERFORMANCE DEFICITS:  Cognitive/Behavioral Status:  Neurologic State: Alert  Orientation Level: Oriented X4  Cognition: Appropriate decision making; Appropriate for age attention/concentration; Appropriate safety awareness; Follows commands  Perception: Appears intact  Perseveration: No perseveration noted  Safety/Judgement: Awareness of environment;Good awareness of safety precautions    Skin: bruise R chin; R elbow purple red and scraped \"my right hip looks the same\". Edema: increased R forearm-arm    Hearing: Auditory  Auditory Impairment: None, Hard of hearing, bilateral    Vision/Perceptual:                                     Range of Motion:  L shoulder flexion 90* baseline  AROM: Generally decreased, functional                         Strength:  -3/5 L shoulder baseline  Strength: Generally decreased, functional                Coordination:  Coordination: Within functional limits  Fine Motor Skills-Upper: Left Intact; Right Intact    Gross Motor Skills-Upper: Left Intact; Right Intact    Tone & Sensation:    Tone: Normal  Sensation: Impaired (light touch intact, reports parasthesias distal LEs 2*neurop)                      Balance:  Sitting: Intact  Standing: Intact    Functional Mobility and Transfers for ADLs:  Bed Mobility:  Supine to Sit: Independent  Sit to Supine: Independent    Transfers:  Sit to Stand: Independent  Stand to Sit: Independent  Bathroom Mobility: Independent  Toilet Transfer : Independent    ADL Assessment:       Oral Facial Hygiene/Grooming: Independent              Lower Body Dressing: Independent    Toileting: Independent       patient stating completing all ADLs. Patient demonstrated above. Functional mobility after toileting in hallway and around nursing station with multiple stimuli, barriers and cognitive testing. Patient recalled 3/5 immediately (words presented again), 4/5 delayed (words presented again) and 5/5 with no cues after 5 mins. ADL Intervention and task modifications:     Patient instructed and indicated understanding the benefits of maintaining activity tolerance, functional mobility, and independence with self care tasks during acute stay  to ensure safe return home and to baseline. Encouraged patient to increase frequency and duration OOB, be out of bed for all meals, perform daily ADLs (as approved by RN/MD regarding bathing etc), and performing functional mobility to/from bathroom. Patient recalled s/s of too much work, pacing benefits, reading body to know when to stop and take a break. Fall prevention. Cognitive Retraining  Safety/Judgement: Awareness of environment;Good awareness of safety precautions    Therapeutic Exercise: Activity Tolerance:   Good   HR     After treatment patient left in no apparent distress:    Supine in bed, Call bell within reach and Caregiver / family present    COMMUNICATION/EDUCATION:   The patients plan of care was discussed with: Registered nurse.      Thank you for this referral.  Nini Arroyo  Time Calculation: 16 mins

## 2021-05-21 NOTE — DISCHARGE SUMMARY
Discharge Summary       PATIENT ID: Morgan Rodriguez  MRN: [id number]   YOB: 1940    DATE OF ADMISSION: 5/21/2021  5:11 AM    DATE OF DISCHARGE: 05/21/2021   PRIMARY CARE PROVIDER: Emeterio Levine MD       DISCHARGING PROVIDER: Heidy Borrego MD    To contact this individual call [phone] and ask the  to page. If unavailable ask to be transferred the Adult Hospitalist Department. CONSULTATIONS: IP CONSULT TO NEUROSURGERY    PROCEDURES/SURGERIES: * No surgery found *    [address] COURSE:   Fall - initial concern for SDH but ruled out     Radha Gee is a [de-identified] y.o.  female who presents with above symptoms. Patient reports had a ground-level fall on her right side, with large bruise on right elbow and right hip, reports she was conscious during the whole process, reports she did not remember hitting her head, she went to emergency room because she had a bruise on right hip, and was concerned about hip fracture, reports she was able to ambulate after fall, denies chest pain shortness of breath dizziness prior to fall, denies recurrent fall. Patient was admitted, neurosurgery was consulted. They reviewed the outside hospital images and do not think that she has a subdural hemorrhage. Head CT was therefore repeated and did not show any evidence of bleeding, they have now signed off. Patient is otherwise doing well, denies any loss of consciousness from the fall. And would like to be discharged home. Will arrange close outpatient follow-up with PCP. Physical therapy evaluated, patient does not have any needs for home. DISCHARGE DIAGNOSES / PLAN:      Fall -with initial concern for subdural hemorrhage, this was now ruled out after repeat CT showed no evidence of bleeding.  -Patient did not have any loss of consciousness  -Check orthostatics prior to discharge    Leukocytosis - no focal sign of infection.  Likely from recent steroid use  COPD with recent exacerbation - resume home meds, inlcuding recent steroids script   Neuropathycontinue gabapentin  Urinary retentioncontinue home medications  GERDcontinue PPI     ADDITIONAL CARE RECOMMENDATIONS:   - Please take all medications as prescribed. Note changes as below. **No changes to home medications  - Please make sure to follow up with your primary care physician within 1-2 weeks of discharge for hospital follow up  - Please make sure to continue to monitor for: Sudden weakness, numbness, tingling, slurred speech, chest pain or shortness of breath and return to the Emergency Department with any of these symptoms:   - Please get up slowly from a seated or laying position, avoid falls. - Avoid tobacco, alcohol and other illicit drug use. PENDING TEST RESULTS:   At the time of discharge the following test results are still pending: None    FOLLOW UP APPOINTMENTS:    Follow-up Information     Follow up With Specialties Details Why Contact Info    Preston Burns MD Family Medicine In 1 week  [facility] [address] [place] [address]  [phone]               DIET: Resume previous diet    ACTIVITY: Activity as tolerated    WOUND CARE: None    EQUIPMENT needed: None      DISCHARGE MEDICATIONS:  Current Discharge Medication List      CONTINUE these medications which have NOT CHANGED    Details   oxyCODONE-acetaminophen (PERCOCET) 5-325 mg per tablet Take 1 Tab by mouth every four (4) hours as needed for Pain. Max Daily Amount: 6 Tabs. Qty: 20 Tab, Refills: 0      ondansetron (ZOFRAN ODT) 4 mg disintegrating tablet Take 1 Tab by mouth every eight (8) hours as needed for Nausea. Qty: 20 Tab, Refills: 0      polyethylene glycol (MIRALAX) 17 gram/dose powder Take 17 g by mouth daily for 30 days. Qty: 510 g, Refills: 11      arformoterol (BROVANA) 15 mcg/2 mL nebu neb solution 15 mcg by Nebulization route two (2) times a day.       budesonide (PULMICORT) 0.5 mg/2 mL nebulizer suspension 500 mcg by Nebulization route two (2) times a day. ipratropium (ATROVENT) 0.02 % nebulizer solution by Nebulization route two (2) times a day. Can use every 4 hrs as needed but does use it twice daily. lansoprazole (PREVACID) 30 mg capsule Take 30 mg by mouth two (2) times a day. potassium chloride (KLOR-CON M20) 20 mEq tablet Take 40 mEq by mouth two (2) times a day. montelukast (SINGULAIR) 10 mg tablet Take 10 mg by mouth daily. tiotropium (SPIRIVA WITH HANDIHALER) 18 mcg inhalation capsule Take 1 Cap by inhalation daily. VENLAFAXINE HCL (EFFEXOR XR PO) Take 150 mg by mouth daily. rOPINIRole (REQUIP) 0.5 mg tablet Take  by mouth. 1 tab in am, one tab in the middle of the day, and 3 tabs at night. VERAPAMIL HCL (VERAPAMIL PO) Take 180 mg by mouth daily. traZODone (DESYREL) 100 mg tablet Take 100 mg by mouth nightly. albuterol (PROAIR HFA) 90 mcg/actuation inhaler Take 2 Puffs by inhalation every four (4) hours as needed for Wheezing. losartan-hydrochlorothiazide (HYZAAR) 100-25 mg per tablet Take 1 Tab by mouth nightly. cetirizine (ZYRTEC) 10 mg tablet Take 10 mg by mouth daily. gabapentin (NEURONTIN) 300 mg capsule Take 300 mg by mouth nightly. furosemide (LASIX) 40 mg tablet Take 40 mg by mouth daily as needed. fluticasone (FLONASE) 50 mcg/actuation nasal spray 2 Sprays by Both Nostrils route two (2) times a day. benzonatate (TESSALON) 200 mg capsule Take 200 mg by mouth three (3) times daily as needed for Cough. cephALEXin (KEFLEX) 500 mg capsule Take  by mouth. 4  prior to dental appt. docusate sodium (STOOL SOFTENER) 100 mg capsule Take 200 mg by mouth nightly. calcium citrate-vitamin d3 (CITRACAL + D) 315-200 mg-unit tab Take 2 Tabs by mouth two (2) times daily (with meals). NOTIFY YOUR PHYSICIAN FOR ANY OF THE FOLLOWING:   Fever over 101 degrees for 24 hours.    Chest pain, shortness of breath, fever, chills, nausea, vomiting, diarrhea, change in mentation, falling, weakness, bleeding. Severe pain or pain not relieved by medications. Or, any other signs or symptoms that you may have questions about. DISPOSITION:  X Home With:   OT  PT  HH  RN       Long term SNF/Inpatient Rehab    Independent/assisted living    Hospice    Other:       PATIENT CONDITION AT DISCHARGE:     Functional status    Poor     Deconditioned    X Independent      Cognition    X Lucid     Forgetful     Dementia      Catheters/lines (plus indication)    Robison     PICC     PEG    X None      Code status   X  Full code     DNR      PHYSICAL EXAMINATION AT DISCHARGE:  Visit Vitals  BP (!) 120/52 (BP 1 Location: Right upper arm, BP Patient Position: At rest)   Pulse 85   Temp 98 °F (36.7 °C)   Resp 18   SpO2 95%     Gen: NAD, awake in bed  HEENT: NC/AT, sclera anicteric, PERRL, EOMI  CV: RRR no m/r/g, normal S1 and S2, no pedal edema   Resp: CTA b/l no increased work of breathing, no wheezing or rhonchi, speaking in full sentences   Abd: NT/ND, normal bowel sounds, no rebound or guarding  Ext: 2+ pulses, no edema. Ecchymoses present over the right hip, right forearm. Skin: No rashes or lesions        CHRONIC MEDICAL DIAGNOSES:  Problem List as of 5/21/2021 Date Reviewed: 5/21/2021        Codes Class Noted - Resolved    [facility] ([facility] ICD-10-CM: I62.00  ICD-9-CM: 432.1  5/21/2021 - Present        Fall ICD-10-CM: W19Julio Cesar Young  ICD-9-CM: I263.8  5/21/2021 - Present              Greater than 30 minutes were spent with the patient on counseling and coordination of care    Signed:   More Medina MD  5/21/2021  3:31 PM

## 2021-05-21 NOTE — PROGRESS NOTES
Problem: Mobility Impaired (Adult and Pediatric)  Goal: *Acute Goals and Plan of Care (Insert Text)  Description: FUNCTIONAL STATUS PRIOR TO ADMISSION: Patient was independent and active without use of DME. Hobbies included crafting (in basement) and gardening    [facility]: The patient lived with  but did not require assist.    Physical Therapy Goals  Initiated 5/21/2021  1. Patient will move from supine to sit and sit to supine  in bed with independence within 7 day(s). 2.  Patient will transfer from bed to chair and chair to bed with independence using the least restrictive device within 7 day(s). 3.  Patient will perform sit to stand with independence within 7 day(s). 4.  Patient will ambulate with independence for 300 feet with the least restrictive device within 7 day(s). 5.  Patient will ascend/descend 10 stairs with R handrail(s) with modified independence within 7 day(s). Outcome: Not Met   PHYSICAL THERAPY EVALUATION  Patient: Sarah Sosa ([de-identified] y.o. female)  Date: 5/21/2021  Primary Diagnosis: Subdural bleeding (HCC) [I62.00]        Precautions: fall       ASSESSMENT  Based on the objective data described below, the patient presents with mild gait dysfunction and high level balance deficits s/p GLF with hematoma R hip and forearm, possible small SDH (head CT to be repeated today). Orthostatic BP assessment performed with negative findings. Pt tolerated amb on unit and stair negotiation with single HR using step-to pattern, supervision / SBA overall. Able to complete functional dynamic standing activities in bathroom without UE support, supervision. Pt presents near baseline LOF with mobility. Encouraged pt to ambulate as tolerated with nursing supervision. Will follow for mobility progression to prevent functional decline if hospital course extended. Pt appropriate for d/c home with support of  when medically appropriate. No follow up PT needs identified. Current Level of Function Impacting Discharge (mobility/balance): bed mob indep, transfers supervision, amb without device supervision, stair negotiation with single HR SBA    Functional Outcome Measure: The patient scored 44/56 on the Saldana balance outcome measure which is indicative of low risk for fall. Other factors to consider for discharge: lives with supportive  who can assist if needed     Patient will benefit from skilled therapy intervention to address the above noted impairments. PLAN :  Recommendations and Planned Interventions: transfer training, gait training, therapeutic exercises, patient and family training/education, and therapeutic activities      Frequency/Duration: Patient will be followed by physical therapy:  2 times a week to address goals. Recommendation for discharge: (in order for the patient to meet his/her long term goals)  No skilled physical therapy/ follow up rehabilitation needs identified at this time. This discharge recommendation:  Has not yet been discussed the attending provider and/or case management    IF patient discharges home will need the following DME: none         SUBJECTIVE:   Patient stated Eri Patricia said I blacked out, but I didn't; I remember everything that happened.     OBJECTIVE DATA SUMMARY:   HISTORY:    Past Medical History:   Diagnosis Date    Arthritis     hands    Asthma     Bronchiectasis (HCC)     Chronic pain     back (pt denies)    GERD (gastroesophageal reflux disease)     Hypertension     MAC (mycobacterium avium-intracellulare complex)     Other ill-defined conditions(799.89)     MAC- mycobacterium avium complex    Other ill-defined conditions(799.89)     restless leg syndrome    Other ill-defined conditions(799.89)     bronchiectasis    Psychiatric disorder     anxiety    Unspecified sleep apnea     wears CPAP at night     Past Surgical History:   Procedure Laterality Date    HX APPENDECTOMY      HX CHOLECYSTECTOMY      HX HYSTERECTOMY  2002    HX MOHS PROCEDURES Right     HX ORTHOPAEDIC Left 2002    hemiarthrectomy    HX ORTHOPAEDIC Right     bunionectomy, hammer toe repair    HX ORTHOPAEDIC Left     3 shoulder surgeries - last was a reverse shoulder replacement    HX TONSILLECTOMY      [place] BREAST SURGERY PROCEDURE UNLISTED      breast reductions       Personal factors and/or comorbidities impacting plan of care: h/o asthma, multiple orthopedic surgeries    Home Situation  Home Environment: Private residence  # Steps to Enter: 2  One/Two Story Residence:  (one level w/ basement where pt crafts)  Living Alone: No  Support Systems: Spouse/Significant Other/Partner  Patient Expects to be Discharged to[de-identified] Private residence  Current DME Used/Available at Home: [place], straight, Wheelchair    EXAMINATION/PRESENTATION/DECISION MAKING:   Critical Behavior:  Neurologic State: Alert  Orientation Level: Oriented X4  Cognition: Appropriate decision making, Appropriate for age attention/concentration, Appropriate safety awareness, Follows commands     Hearing:   Auditory  Auditory Impairment: None, Hard of hearing, bilateral  Skin:  Bruising present R UE/ hip/ chin  Range Of Motion:  AROM: Generally decreased, functional                       Strength:    Strength: Generally decreased, functional                    Tone & Sensation:   Tone: Normal              Sensation: Impaired (light touch intact, reports parasthesias distal LEs 2*neurop)               Coordination:  Coordination: Within functional limits  Vision:      Functional Mobility:  Bed Mobility:     Supine to Sit: Independent  Sit to Supine: Independent     Transfers:  Sit to Stand: Independent  Stand to Sit: Independent                       Balance:   Sitting: Intact  Standing: Intact  Ambulation/Gait Training:  Distance (ft): 300 Feet (ft)  Assistive Device: Gait belt  Ambulation - Level of Assistance: Supervision        Gait Abnormalities: Decreased step clearance; Altered arm swing;Trunk sway increased (slightly guarded movement)        Base of Support: Widened     Speed/Yun: Pace decreased (<100 feet/min)  Step Length: Left shortened;Right shortened        Stairs:  Number of Stairs Trained: 10  Stairs - Level of Assistance: Stand-by assistance   Rail Use: Right         Functional Measure:  Saldana Balance Test:    Sitting to Standing: 3  Standing Unsupported: 4  Sitting with Back Unsupported: 4  Standing to Sittin  Transfers: 4  Standing Unsupported with Eyes Closed: 3  Standing Unsupported with Feet Together: 3  Reach Forward with Outstretched Arm: 4   Object: 3  Turn to Look Over Shoulders: 4  Turn 360 Degrees: 4  Alternate Foot on Step/Stool: 2  Standing Unsupported One Foot in Front: 1  Stand on One Le  Total: 44/56         56=Maximum possible score;   0-20=High fall risk  21-40=Moderate fall risk   41-56=Low fall risk               Physical Therapy Evaluation Charge Determination   History Examination Presentation Decision-Making   MEDIUM  Complexity : 1-2 comorbidities / personal factors will impact the outcome/ POC  MEDIUM Complexity : 3 Standardized tests and measures addressing body structure, function, activity limitation and / or participation in recreation  LOW Complexity : Stable, uncomplicated  LOW Complexity : FOTO score of       Based on the above components, the patient evaluation is determined to be of the following complexity level: LOW     Pain Rating:  Pt notes minimal soreness due to bruising    Activity Tolerance:   Good    After treatment patient left in no apparent distress:   Call bell within reach, Caregiver / family present, Side rails x 3 and sitting EOB    COMMUNICATION/EDUCATION:   The patients plan of care was discussed with: Registered nurse. Fall prevention education was provided and the patient/caregiver indicated understanding., Patient/family have participated as able in goal setting and plan of care. and Patient/family agree to work toward stated goals and plan of care.     Thank you for this referral.  Alex Rousseau, PT   Time Calculation: 29 mins

## 2021-05-21 NOTE — PROGRESS NOTES
Case d/w dr. Charmaine Hanson last night. Looks like a normal head ct to me. Can repeat today.   No need for neurosurgical intervention

## 2021-05-21 NOTE — DISCHARGE INSTRUCTIONS
Dear Elisabeth Wilhelm,    Thank you for choosing [address] [place] [address] for your healthcare needs. We strive to provide EXCELLENT care to you and your family. In an effort to explain clearly why you were here in the hospital, I've also written a very brief summary below. Other details including formal diagnosis, medication changes, and follow up appointment recommendations can also be found in this packet. You were admitted for concern for subdural hemorrhage due to fall for which you were closely monitored, and CT scan was repeated. Fortunately subdural hemorrhage was ruled out. You also received care from specialist physicians in the following specialties:  Eder Austin    Here are the updates to your medication list:  **No changes to home medications    Remember that it is important for you to take your medications exactly as they are prescribed. It is helpful to keep a list of your medication with the names, dosages, and times to be taken in your wallet. Additionally,   - Please make sure to follow up with your primary care physician within 1-2 weeks of discharge for hospital follow up  - Please make sure to continue to monitor for: Sudden weakness, numbness, tingling, slurred speech, chest pain or shortness of breath and return to the Emergency Department with any of these symptoms:   - Please get up slowly from a seated or laying position, avoid falls. - Avoid tobacco, alcohol and other illicit drug use. Make sure to also see your primary care doctor for follow-up. Bring these papers with you and be sure to review your medication list with your doctor. I cannot stress the importance of follow up enough. I've included the information for your follow-up appointments below:     Follow-up Information     Follow up With Specialties Details Why Contact Info    Ruth Robles MD Family Medicine In 1 week  [address]  [address]  [phone] Should you have any fever over 101 degrees for 24 hours, chest pain, shortness of breath, fever, chills, nausea, vomiting, diarrhea, change in mentation, falling, weakness, bleeding, or worsening pain, please seek medical attention immediately. Finally, as your discharging physician, you may be receiving a survey regarding my care. I would greatly value and appreciate your input in the survey as we strive for excellence. If you have any questions, I can be reached at [phone].   Thank you so much again for allowing me to care for you at [facility].    Respectfully yours,  Jeffrey Simmons MD

## 2021-05-21 NOTE — PROGRESS NOTES
Transition of Care Plan   RUR- Low  12%   DISPOSITION: Likely return home with assist from    F/U with PCP/Specialist     Transport: , Kira Ames     Reason for Admission:  Subdural hematoma                     RUR Score:       12%              Plan for utilizing home health:    Patient has used home health in the past; open to using again; open to any provider that travels to [place], [place]. Pending PT/OT eval.       PCP: First and Last name:  Rai Gonzales MD     Name of Practice: [facility]   Are you a current patient: Yes/No: Yes   Approximate date of last visit: last week   Can you participate in a virtual visit with your PCP: no                    Current Advanced Directive/Advance Care Plan: No Order      Healthcare Decision Maker: NOK is patient's , Kira Jean  Click here to [address] including selection of the Healthcare Decision Maker Relationship (ie \"Primary\")                          Transition of Care Plan:     CM met with patient at bedside to introduce self and role. Patient lives with spouse in [address] + basement, 2 steps to enter and 12 to the basement. Patient is independent at baseline with ADLs; does not use any DME to ambulate. Patient open to any recommendations from PT/OT/MD. CM confirmed demographics. CM to follow patient progress and assist as recommended. Care Management Interventions  PCP Verified by CM: Yes  Palliative Care Criteria Met (RRAT>21 & CHF Dx)?: No  Mode of Transport at Discharge:  Other (see comment) ()  MyChart Signup: No  Discharge Durable Medical Equipment: No  Health Maintenance Reviewed: Yes  Physical Therapy Consult: Yes  Occupational Therapy Consult: Yes  Speech Therapy Consult: No  Current Support Network: Lives with Spouse  Confirm Follow Up Transport: Family  Discharge Location  Discharge Placement: Home    Medicare pt has received, reviewed, and signed 1st  letter informing them of their right to appeal the discharge. Signed copy has been placed on pt bedside chart. Corrine Bumpers) Versie Evener, M.S.W.

## 2022-02-18 ENCOUNTER — APPOINTMENT (OUTPATIENT)
Dept: GENERAL RADIOLOGY | Age: 82
DRG: 190 | End: 2022-02-18
Attending: STUDENT IN AN ORGANIZED HEALTH CARE EDUCATION/TRAINING PROGRAM
Payer: MEDICARE

## 2022-02-18 ENCOUNTER — APPOINTMENT (OUTPATIENT)
Dept: CT IMAGING | Age: 82
DRG: 190 | End: 2022-02-18
Attending: STUDENT IN AN ORGANIZED HEALTH CARE EDUCATION/TRAINING PROGRAM
Payer: MEDICARE

## 2022-02-18 ENCOUNTER — HOSPITAL ENCOUNTER (INPATIENT)
Age: 82
LOS: 2 days | Discharge: HOME | DRG: 190 | End: 2022-02-20
Attending: STUDENT IN AN ORGANIZED HEALTH CARE EDUCATION/TRAINING PROGRAM | Admitting: HOSPITALIST
Payer: MEDICARE

## 2022-02-18 DIAGNOSIS — J45.41 MODERATE PERSISTENT REACTIVE AIRWAY DISEASE WITH ACUTE EXACERBATION: Primary | ICD-10-CM

## 2022-02-18 PROBLEM — J44.1 COPD WITH ACUTE EXACERBATION (HCC): Status: ACTIVE | Noted: 2022-02-18

## 2022-02-18 PROBLEM — J45.901 EXACERBATION OF ASTHMA: Status: ACTIVE | Noted: 2022-02-18

## 2022-02-18 LAB
ALBUMIN SERPL-MCNC: 2.7 G/DL (ref 3.5–5)
ALBUMIN/GLOB SERPL: 0.5 {RATIO} (ref 1.1–2.2)
ALP SERPL-CCNC: 177 U/L (ref 45–117)
ALT SERPL-CCNC: 47 U/L (ref 12–78)
ANION GAP SERPL CALC-SCNC: 6 MMOL/L (ref 5–15)
APPEARANCE UR: CLEAR
AST SERPL W P-5'-P-CCNC: 37 U/L (ref 15–37)
ATRIAL RATE: 85 BPM
BACTERIA URNS QL MICRO: NEGATIVE /HPF
BASOPHILS # BLD: 0.1 K/UL (ref 0–0.1)
BASOPHILS NFR BLD: 0 % (ref 0–1)
BILIRUB SERPL-MCNC: 0.3 MG/DL (ref 0.2–1)
BILIRUB UR QL: NEGATIVE
BUN SERPL-MCNC: 25 MG/DL (ref 6–20)
BUN/CREAT SERPL: 21 (ref 12–20)
CA-I BLD-MCNC: 9.9 MG/DL (ref 8.5–10.1)
CALCULATED P AXIS, ECG09: 75 DEGREES
CALCULATED R AXIS, ECG10: -16 DEGREES
CALCULATED T AXIS, ECG11: 66 DEGREES
CHLORIDE SERPL-SCNC: 99 MMOL/L (ref 97–108)
CO2 SERPL-SCNC: 28 MMOL/L (ref 21–32)
COLOR UR: NORMAL
COVID-19 RAPID TEST, COVR: NOT DETECTED
CREAT SERPL-MCNC: 1.19 MG/DL (ref 0.55–1.02)
DIAGNOSIS, 93000: NORMAL
DIFFERENTIAL METHOD BLD: ABNORMAL
EOSINOPHIL # BLD: 0 K/UL (ref 0–0.4)
EOSINOPHIL NFR BLD: 0 % (ref 0–7)
ERYTHROCYTE [DISTWIDTH] IN BLOOD BY AUTOMATED COUNT: 13.2 % (ref 11.5–14.5)
GLOBULIN SER CALC-MCNC: 5.2 G/DL (ref 2–4)
GLUCOSE SERPL-MCNC: 164 MG/DL (ref 65–100)
GLUCOSE UR STRIP.AUTO-MCNC: NEGATIVE MG/DL
HCT VFR BLD AUTO: 33.3 % (ref 35–47)
HGB BLD-MCNC: 10.9 G/DL (ref 11.5–16)
HGB UR QL STRIP: NEGATIVE
IMM GRANULOCYTES # BLD AUTO: 0.4 K/UL (ref 0–0.04)
IMM GRANULOCYTES NFR BLD AUTO: 2 % (ref 0–0.5)
KETONES UR QL STRIP.AUTO: NEGATIVE MG/DL
LACTATE SERPL-SCNC: 1.5 MMOL/L (ref 0.4–2)
LEUKOCYTE ESTERASE UR QL STRIP.AUTO: NEGATIVE
LYMPHOCYTES # BLD: 1.2 K/UL (ref 0.8–3.5)
LYMPHOCYTES NFR BLD: 6 % (ref 12–49)
MCH RBC QN AUTO: 29.7 PG (ref 26–34)
MCHC RBC AUTO-ENTMCNC: 32.7 G/DL (ref 30–36.5)
MCV RBC AUTO: 90.7 FL (ref 80–99)
MONOCYTES # BLD: 0.5 K/UL (ref 0–1)
MONOCYTES NFR BLD: 2 % (ref 5–13)
MUCOUS THREADS URNS QL MICRO: NORMAL /LPF
NEUTS SEG # BLD: 18.8 K/UL (ref 1.8–8)
NEUTS SEG NFR BLD: 90 % (ref 32–75)
NITRITE UR QL STRIP.AUTO: NEGATIVE
NRBC # BLD: 0 K/UL (ref 0–0.01)
NRBC BLD-RTO: 0 PER 100 WBC
P-R INTERVAL, ECG05: 186 MS
PH UR STRIP: 5 [PH] (ref 5–8)
PLATELET # BLD AUTO: 337 K/UL (ref 150–400)
PMV BLD AUTO: 9.9 FL (ref 8.9–12.9)
POTASSIUM SERPL-SCNC: 3.4 MMOL/L (ref 3.5–5.1)
PROT SERPL-MCNC: 7.9 G/DL (ref 6.4–8.2)
PROT UR STRIP-MCNC: NEGATIVE MG/DL
Q-T INTERVAL, ECG07: 352 MS
QRS DURATION, ECG06: 62 MS
QTC CALCULATION (BEZET), ECG08: 418 MS
RBC # BLD AUTO: 3.67 M/UL (ref 3.8–5.2)
RBC #/AREA URNS HPF: NORMAL /HPF (ref 0–5)
SODIUM SERPL-SCNC: 133 MMOL/L (ref 136–145)
SP GR UR REFRACTOMETRY: 1.01 (ref 1–1.03)
SPECIMEN SOURCE: NORMAL
UROBILINOGEN UR QL STRIP.AUTO: 0.1 EU/DL (ref 0.1–1)
VENTRICULAR RATE, ECG03: 85 BPM
WBC # BLD AUTO: 21 K/UL (ref 3.6–11)
WBC URNS QL MICRO: NORMAL /HPF (ref 0–4)

## 2022-02-18 PROCEDURE — 81003 URINALYSIS AUTO W/O SCOPE: CPT

## 2022-02-18 PROCEDURE — 99285 EMERGENCY DEPT VISIT HI MDM: CPT

## 2022-02-18 PROCEDURE — 80053 COMPREHEN METABOLIC PANEL: CPT

## 2022-02-18 PROCEDURE — 74011000636 HC RX REV CODE- 636: Performed by: STUDENT IN AN ORGANIZED HEALTH CARE EDUCATION/TRAINING PROGRAM

## 2022-02-18 PROCEDURE — 74011250637 HC RX REV CODE- 250/637: Performed by: HOSPITALIST

## 2022-02-18 PROCEDURE — 74011000250 HC RX REV CODE- 250: Performed by: STUDENT IN AN ORGANIZED HEALTH CARE EDUCATION/TRAINING PROGRAM

## 2022-02-18 PROCEDURE — 74011250636 HC RX REV CODE- 250/636: Performed by: STUDENT IN AN ORGANIZED HEALTH CARE EDUCATION/TRAINING PROGRAM

## 2022-02-18 PROCEDURE — 94640 AIRWAY INHALATION TREATMENT: CPT

## 2022-02-18 PROCEDURE — 94761 N-INVAS EAR/PLS OXIMETRY MLT: CPT

## 2022-02-18 PROCEDURE — 65270000029 HC RM PRIVATE

## 2022-02-18 PROCEDURE — 83605 ASSAY OF LACTIC ACID: CPT

## 2022-02-18 PROCEDURE — 71045 X-RAY EXAM CHEST 1 VIEW: CPT

## 2022-02-18 PROCEDURE — 85025 COMPLETE CBC W/AUTO DIFF WBC: CPT

## 2022-02-18 PROCEDURE — 93005 ELECTROCARDIOGRAM TRACING: CPT

## 2022-02-18 PROCEDURE — 36415 COLL VENOUS BLD VENIPUNCTURE: CPT

## 2022-02-18 PROCEDURE — 87040 BLOOD CULTURE FOR BACTERIA: CPT

## 2022-02-18 PROCEDURE — 71275 CT ANGIOGRAPHY CHEST: CPT

## 2022-02-18 PROCEDURE — 74011250636 HC RX REV CODE- 250/636: Performed by: HOSPITALIST

## 2022-02-18 PROCEDURE — 87635 SARS-COV-2 COVID-19 AMP PRB: CPT

## 2022-02-18 RX ORDER — LEVOFLOXACIN 500 MG/1
500 TABLET, FILM COATED ORAL DAILY
COMMUNITY
End: 2022-02-20

## 2022-02-18 RX ORDER — POTASSIUM CHLORIDE 20 MEQ/1
20 TABLET, EXTENDED RELEASE ORAL 2 TIMES DAILY
Status: DISCONTINUED | OUTPATIENT
Start: 2022-02-18 | End: 2022-02-20 | Stop reason: HOSPADM

## 2022-02-18 RX ORDER — PREDNISONE 10 MG/1
TABLET ORAL
COMMUNITY

## 2022-02-18 RX ORDER — DOCUSATE SODIUM 100 MG/1
100 CAPSULE, LIQUID FILLED ORAL 2 TIMES DAILY
Status: DISCONTINUED | OUTPATIENT
Start: 2022-02-18 | End: 2022-02-20 | Stop reason: HOSPADM

## 2022-02-18 RX ORDER — ROPINIROLE 0.25 MG/1
0.5 TABLET, FILM COATED ORAL 2 TIMES DAILY
Status: DISCONTINUED | OUTPATIENT
Start: 2022-02-19 | End: 2022-02-20 | Stop reason: HOSPADM

## 2022-02-18 RX ORDER — COLCHICINE 0.6 MG/1
0.6 TABLET ORAL 2 TIMES DAILY
Status: DISCONTINUED | OUTPATIENT
Start: 2022-02-18 | End: 2022-02-19

## 2022-02-18 RX ORDER — METOLAZONE 5 MG/1
5 TABLET ORAL DAILY
COMMUNITY

## 2022-02-18 RX ORDER — ALLOPURINOL 300 MG/1
150 TABLET ORAL DAILY
Status: DISCONTINUED | OUTPATIENT
Start: 2022-02-19 | End: 2022-02-19

## 2022-02-18 RX ORDER — SODIUM CHLORIDE 0.9 % (FLUSH) 0.9 %
5-40 SYRINGE (ML) INJECTION AS NEEDED
Status: DISCONTINUED | OUTPATIENT
Start: 2022-02-18 | End: 2022-02-20 | Stop reason: HOSPADM

## 2022-02-18 RX ORDER — FAMOTIDINE 20 MG/1
20 TABLET, FILM COATED ORAL
COMMUNITY

## 2022-02-18 RX ORDER — FUROSEMIDE 40 MG/1
40 TABLET ORAL DAILY
COMMUNITY

## 2022-02-18 RX ORDER — PANTOPRAZOLE SODIUM 40 MG/1
40 TABLET, DELAYED RELEASE ORAL DAILY
COMMUNITY

## 2022-02-18 RX ORDER — SODIUM CHLORIDE 0.9 % (FLUSH) 0.9 %
5-40 SYRINGE (ML) INJECTION EVERY 8 HOURS
Status: DISCONTINUED | OUTPATIENT
Start: 2022-02-18 | End: 2022-02-20 | Stop reason: HOSPADM

## 2022-02-18 RX ORDER — IPRATROPIUM BROMIDE AND ALBUTEROL SULFATE 2.5; .5 MG/3ML; MG/3ML
3 SOLUTION RESPIRATORY (INHALATION)
Status: DISCONTINUED | OUTPATIENT
Start: 2022-02-19 | End: 2022-02-20 | Stop reason: HOSPADM

## 2022-02-18 RX ORDER — COLCHICINE 0.6 MG/1
0.6 TABLET ORAL 2 TIMES DAILY
COMMUNITY

## 2022-02-18 RX ORDER — ALCLOMETASONE DIPROPIONATE 0.5 MG/G
OINTMENT TOPICAL 2 TIMES DAILY
COMMUNITY

## 2022-02-18 RX ORDER — IRON 18 MG
1 TABLET ORAL DAILY
COMMUNITY

## 2022-02-18 RX ORDER — ALBUTEROL SULFATE 90 UG/1
2 AEROSOL, METERED RESPIRATORY (INHALATION)
COMMUNITY

## 2022-02-18 RX ORDER — ALLOPURINOL 300 MG/1
150 TABLET ORAL DAILY
COMMUNITY

## 2022-02-18 RX ORDER — CYANOCOBALAMIN (VITAMIN B-12) 2500 MCG
1 TABLET, SUBLINGUAL SUBLINGUAL EVERY EVENING
COMMUNITY

## 2022-02-18 RX ORDER — ROPINIROLE 0.5 MG/1
1.5 TABLET, FILM COATED ORAL
COMMUNITY

## 2022-02-18 RX ORDER — GUAIFENESIN 100 MG/5ML
81 LIQUID (ML) ORAL 2 TIMES DAILY
Status: DISCONTINUED | OUTPATIENT
Start: 2022-02-18 | End: 2022-02-20 | Stop reason: HOSPADM

## 2022-02-18 RX ORDER — ALBUTEROL SULFATE 2.5 MG/.5ML
2.5 SOLUTION RESPIRATORY (INHALATION)
Status: COMPLETED | OUTPATIENT
Start: 2022-02-18 | End: 2022-02-18

## 2022-02-18 RX ORDER — GUAIFENESIN 100 MG/5ML
81 LIQUID (ML) ORAL 2 TIMES DAILY
COMMUNITY

## 2022-02-18 RX ORDER — MONTELUKAST SODIUM 10 MG/1
10 TABLET ORAL DAILY
Status: DISCONTINUED | OUTPATIENT
Start: 2022-02-19 | End: 2022-02-20 | Stop reason: HOSPADM

## 2022-02-18 RX ORDER — TRAZODONE HYDROCHLORIDE 50 MG/1
150 TABLET ORAL
Status: DISCONTINUED | OUTPATIENT
Start: 2022-02-18 | End: 2022-02-20 | Stop reason: HOSPADM

## 2022-02-18 RX ORDER — AZELASTINE HYDROCHLORIDE 0.5 MG/ML
1 SOLUTION/ DROPS OPHTHALMIC 2 TIMES DAILY
COMMUNITY

## 2022-02-18 RX ORDER — ALBUTEROL SULFATE 90 UG/1
2 AEROSOL, METERED RESPIRATORY (INHALATION)
Status: DISCONTINUED | OUTPATIENT
Start: 2022-02-18 | End: 2022-02-20 | Stop reason: HOSPADM

## 2022-02-18 RX ORDER — FUROSEMIDE 40 MG/1
40 TABLET ORAL DAILY
Status: DISCONTINUED | OUTPATIENT
Start: 2022-02-19 | End: 2022-02-20 | Stop reason: HOSPADM

## 2022-02-18 RX ORDER — ONDANSETRON 4 MG/1
4 TABLET, ORALLY DISINTEGRATING ORAL
Status: DISCONTINUED | OUTPATIENT
Start: 2022-02-18 | End: 2022-02-20 | Stop reason: HOSPADM

## 2022-02-18 RX ORDER — LOSARTAN POTASSIUM AND HYDROCHLOROTHIAZIDE 12.5; 1 MG/1; MG/1
1 TABLET ORAL DAILY
COMMUNITY

## 2022-02-18 RX ORDER — GABAPENTIN 100 MG/1
100 CAPSULE ORAL DAILY
Status: DISCONTINUED | OUTPATIENT
Start: 2022-02-19 | End: 2022-02-19

## 2022-02-18 RX ORDER — IPRATROPIUM BROMIDE AND ALBUTEROL SULFATE 2.5; .5 MG/3ML; MG/3ML
3 SOLUTION RESPIRATORY (INHALATION)
Status: COMPLETED | OUTPATIENT
Start: 2022-02-18 | End: 2022-02-18

## 2022-02-18 RX ORDER — ROPINIROLE 1 MG/1
1.5 TABLET, FILM COATED ORAL
Status: DISCONTINUED | OUTPATIENT
Start: 2022-02-18 | End: 2022-02-20 | Stop reason: HOSPADM

## 2022-02-18 RX ORDER — KETOCONAZOLE 20 MG/ML
SHAMPOO TOPICAL DAILY PRN
COMMUNITY

## 2022-02-18 RX ORDER — PANTOPRAZOLE SODIUM 40 MG/1
40 TABLET, DELAYED RELEASE ORAL DAILY
Status: DISCONTINUED | OUTPATIENT
Start: 2022-02-19 | End: 2022-02-19

## 2022-02-18 RX ORDER — BUDESONIDE AND FORMOTEROL FUMARATE DIHYDRATE 160; 4.5 UG/1; UG/1
2 AEROSOL RESPIRATORY (INHALATION)
Status: DISCONTINUED | OUTPATIENT
Start: 2022-02-18 | End: 2022-02-19

## 2022-02-18 RX ORDER — FLUTICASONE PROPIONATE 50 MCG
2 SPRAY, SUSPENSION (ML) NASAL 2 TIMES DAILY
Status: DISCONTINUED | OUTPATIENT
Start: 2022-02-18 | End: 2022-02-20 | Stop reason: HOSPADM

## 2022-02-18 RX ORDER — VENLAFAXINE HYDROCHLORIDE 75 MG/1
150 CAPSULE, EXTENDED RELEASE ORAL DAILY
Status: DISCONTINUED | OUTPATIENT
Start: 2022-02-19 | End: 2022-02-20 | Stop reason: HOSPADM

## 2022-02-18 RX ADMIN — SODIUM CHLORIDE 500 ML: 9 INJECTION, SOLUTION INTRAVENOUS at 16:33

## 2022-02-18 RX ADMIN — METHYLPREDNISOLONE SODIUM SUCCINATE 37.5 MG: 125 INJECTION, POWDER, FOR SOLUTION INTRAMUSCULAR; INTRAVENOUS at 23:49

## 2022-02-18 RX ADMIN — DOCUSATE SODIUM 100 MG: 100 CAPSULE, LIQUID FILLED ORAL at 23:49

## 2022-02-18 RX ADMIN — BUDESONIDE AND FORMOTEROL FUMARATE DIHYDRATE 2 PUFF: 160; 4.5 AEROSOL RESPIRATORY (INHALATION) at 22:55

## 2022-02-18 RX ADMIN — ALBUTEROL SULFATE 2.5 MG: 2.5 SOLUTION RESPIRATORY (INHALATION) at 19:26

## 2022-02-18 RX ADMIN — TRAZODONE HYDROCHLORIDE 150 MG: 50 TABLET ORAL at 23:48

## 2022-02-18 RX ADMIN — IOPAMIDOL 100 ML: 755 INJECTION, SOLUTION INTRAVENOUS at 20:25

## 2022-02-18 RX ADMIN — ALBUTEROL SULFATE 2.5 MG: 2.5 SOLUTION RESPIRATORY (INHALATION) at 19:25

## 2022-02-18 RX ADMIN — IPRATROPIUM BROMIDE AND ALBUTEROL SULFATE 3 ML: .5; 3 SOLUTION RESPIRATORY (INHALATION) at 16:40

## 2022-02-18 NOTE — ED TRIAGE NOTES
Pt started with SOB over a week ago. Seen at Dr. Dustin Patel office yesterday and told to come here due to hypotension and low O2 saturations.  Has tried ABX at home with no improvement

## 2022-02-18 NOTE — ED PROVIDER NOTES
EMERGENCY DEPARTMENT HISTORY AND PHYSICAL EXAM      Date: 2/18/2022  Patient Name: Peggy Triplett      History of Presenting Illness     Chief Complaint   Patient presents with    Shortness of Breath       History Provided By: Patient    HPI: Peggy Triplett, 81 y.o. female with PMH of asthma and bronchiectasis presents for shortness of breath. Patient was seen at her PMD office in which was started on Levaquin and prednisone for presumed pneumonia. Patient symptoms of shortness of breath, fever, chills Kelvin progressive over the last several days. She today she feels better given that she was started on prednisone. He was noted the patient had episodes of hypotension over the last several days. Thus, sent by her pulmonologist and PMD to the ED for further evaluation and work-up. Currently, there is no chest pain, abdominal pain, nausea or vomiting. There are no other complaints, changes, or physical findings at this time.     PCP: Pinky Davalos MD    Current Facility-Administered Medications   Medication Dose Route Frequency Provider Last Rate Last Admin    losartan (COZAAR) tablet 100 mg  100 mg Oral DAILY Gerber Haynes MD   100 mg at 02/19/22 0843    And    hydroCHLOROthiazide (HYDRODIURIL) tablet 12.5 mg  12.5 mg Oral DAILY Gerber Haynes MD   12.5 mg at 02/19/22 0844    [START ON 2/20/2022] pantoprazole (PROTONIX) tablet 40 mg  40 mg Oral ACB Ricardo Gonzalez MD        famotidine (PEPCID) tablet 40 mg  40 mg Oral QHS Ricardo Gonzalez MD        predniSONE (DELTASONE) tablet 20 mg  20 mg Oral BID WITH MEALS MD Anahi Orozco ON 2/20/2022] azithromycin [facility]) tablet 500 mg  500 mg Oral DAILY Ricardo Gonzalez MD        acetaZOLAMIDE SR (DIAMOX) capsule 500 mg  500 mg Oral Q6H Ricardo Gonzalez MD        dextromethorphan (DELSYM) 30 mg/5 mL syrup 30 mg  30 mg Oral Q12H PRN Glennl Jamie GRANADOS MD        montelukast (SINGULAIR) tablet 10 mg  10 mg Oral DAILY Carisa Modi MD   10 mg at 02/19/22 0844    venlafaxine-SR (EFFEXOR-XR) capsule 150 mg  150 mg Oral DAILY Carisa Modi MD   150 mg at 02/19/22 0845    rOPINIRole (REQUIP) tablet 0.5 mg  0.5 mg Oral BID Carisa Modi MD   0.5 mg at 02/19/22 0844    traZODone (DESYREL) tablet 150 mg  150 mg Oral QHS Carisa Modi MD   150 mg at 02/18/22 2348    fluticasone propionate (FLONASE) 50 mcg/actuation nasal spray 2 Spray  2 Spray Both Nostrils BID Carisa Modi MD   2 Spray at 02/19/22 0854    potassium chloride (K-DUR, KLOR-CON M20) SR tablet 20 mEq  20 mEq Oral BID Carisa Modi MD   20 mEq at 02/19/22 0843    aspirin chewable tablet 81 mg  81 mg Oral BID Carisa Modi MD   81 mg at 02/19/22 0845    rOPINIRole (REQUIP) tablet 1.5 mg  1.5 mg Oral QHS Carisa Modi MD   1.5 mg at 02/19/22 7972    furosemide (LASIX) tablet 40 mg  40 mg Oral DAILY Carisa Modi MD   40 mg at 02/19/22 0843    albuterol (PROVENTIL HFA, VENTOLIN HFA, PROAIR HFA) inhaler 2 Puff  2 Puff Inhalation Q4H PRN Carisa Modi MD        sodium chloride (NS) flush 5-40 mL  5-40 mL IntraVENous Q8H Carisa Modi MD   10 mL at 02/19/22 0453    sodium chloride (NS) flush 5-40 mL  5-40 mL IntraVENous PRN Carisa Modi MD        ondansetron (ZOFRAN ODT) tablet 4 mg  4 mg Oral Q6H PRN Carisa Modi MD        docusate sodium (COLACE) capsule 100 mg  100 mg Oral BID Carisa Modi MD   100 mg at 02/19/22 0900    albuterol-ipratropium (DUO-NEB) 2.5 MG-0.5 MG/3 ML  3 mL Nebulization QID RT Carisa Modi MD   3 mL at 02/19/22 1200       Past History     Past Medical History:  Past Medical History:   Diagnosis Date    Arthritis     hands    Asthma     Bronchiectasis ([facility] [address]     Chronic pain     back (pt denies)    GERD (gastroesophageal reflux disease)     Hypertension     MAC (mycobacterium avium-intracellulare complex)     Other ill-defined conditions(799.89)     MAC- mycobacterium avium complex    Other ill-defined conditions(799.89)     restless leg syndrome    Other ill-defined conditions(799.89)     bronchiectasis    Psychiatric disorder     anxiety    Unspecified sleep apnea     wears CPAP at night       Past Surgical History:  Past Surgical History:   Procedure Laterality Date    HX APPENDECTOMY      HX CHOLECYSTECTOMY      HX HYSTERECTOMY  2002    HX MOHS PROCEDURES Right     HX ORTHOPAEDIC Left 2002    hemiarthrectomy    HX ORTHOPAEDIC Right     bunionectomy, hammer toe repair    HX ORTHOPAEDIC Left     3 shoulder surgeries - last was a reverse shoulder replacement    HX TONSILLECTOMY      SD BREAST SURGERY PROCEDURE UNLISTED      breast reductions       Family History:  Family History   Problem Relation Age of Onset    Stroke Mother     Lung Disease Father     Cancer Sister         bladder    OSTEOARTHRITIS Sister     OSTEOARTHRITIS Sister     Hypertension Sister     Heart Disease Sister     Other Brother         dies with complications with cholecystectomy       Social History:  Social History     Tobacco Use    Smoking status: Never Smoker    Smokeless tobacco: Never Used   Substance Use Topics    Alcohol use: No    Drug use: No       Allergies: Allergies   Allergen Reactions    Latex Other (comments)     Flu like symptoms    Clindamycin Nausea Only    Darvocet A500 [Propoxyphene N-Acetaminophen] Unknown (comments)     Not sure patient states it was on her 'chart'    Penicillins Rash     Not really sure patient had bruises at the time    Reglan [Metoclopramide] Other (comments)     Made patient feel like a 'zombie'         Review of Systems     Review of Systems     A 10 point review of system was performed and was negative except as noted above in HPI    Physical Exam     Physical Exam  Vitals and nursing note reviewed.    Constitutional:       General: She is not in acute distress. Appearance: She is obese. She is not ill-appearing, toxic-appearing or diaphoretic. HENT:      Head: Normocephalic and atraumatic. Cardiovascular:      Rate and Rhythm: Normal rate and regular rhythm. Pulmonary:      Effort: Tachypnea present. Breath sounds: Wheezing and rhonchi present. Abdominal:      Palpations: Abdomen is soft. Tenderness: There is no abdominal tenderness. Musculoskeletal:      Right lower leg: No tenderness. No edema. Left lower leg: No tenderness. No edema. Skin:     General: Skin is warm and dry. Neurological:      Mental Status: She is alert and oriented to person, place, and time.          Lab and Diagnostic Study Results     Labs -     Recent Results (from the past 12 hour(s))   BLOOD GAS, ARTERIAL    Collection Time: 02/19/22  5:58 AM   Result Value Ref Range    pH 7.40 7.35 - 7.45      PCO2 48 (H) 35 - 45 mmHg    PO2 100 75 - 100 mmHg    O2 SAT 99 >95 %    BICARBONATE 28 (H) 22 - 26 mmol/L    BASE EXCESS 3.8 (H) 0 - 2 mmol/L    O2 METHOD Nasal Cannula      O2 FLOW RATE 2 L/min    SITE Right Radial      YAMIL'S TEST PASS     CBC W/O DIFF    Collection Time: 02/19/22  9:01 AM   Result Value Ref Range    WBC 18.0 (H) 3.6 - 11.0 K/uL    RBC 3.50 (L) 3.80 - 5.20 M/uL    HGB 10.3 (L) 11.5 - 16.0 g/dL    HCT 32.0 (L) 35.0 - 47.0 %    MCV 91.4 80.0 - 99.0 FL    MCH 29.4 26.0 - 34.0 PG    MCHC 32.2 30.0 - 36.5 g/dL    RDW 13.3 11.5 - 14.5 %    PLATELET 828 255 - 926 K/uL    MPV 10.2 8.9 - 12.9 FL    NRBC 0.0 0.0  WBC    ABSOLUTE NRBC 0.00 0.00 - 0.01 K/uL   RENAL FUNCTION PANEL    Collection Time: 02/19/22  9:01 AM   Result Value Ref Range    Sodium 137 136 - 145 mmol/L    Potassium 3.5 3.5 - 5.1 mmol/L    Chloride 103 97 - 108 mmol/L    CO2 27 21 - 32 mmol/L    Anion gap 7 5 - 15 mmol/L    Glucose 192 (H) 65 - 100 mg/dL    BUN 24 (H) 6 - 20 mg/dL    Creatinine 1.01 0.55 - 1.02 mg/dL    BUN/Creatinine ratio 24 (H) 12 - 20      GFR est AA >60 >60 ml/min/1.73m2    GFR est non-AA 53 (L) >60 ml/min/1.73m2    Calcium 9.6 8.5 - 10.1 mg/dL    Phosphorus 3.5 2.6 - 4.7 mg/dL    Albumin 2.7 (L) 3.5 - 5.0 g/dL   TSH 3RD GENERATION    Collection Time: 02/19/22  9:01 AM   Result Value Ref Range    TSH 0.14 (L) 0.36 - 3.74 uIU/mL       Radiologic Studies -   [unfilled]  CT Results  (Last 48 hours)               02/18/22 2026  CTA CHEST W OR W WO CONT Final result    Impression:  Suboptimal due to respiratory motion artifacts. As visualized, no CT   findings of acute pulmonary embolus. Narrative:  Dose Reduction:    All CT scans at this facility are performed using dose reduction optimization   techniques as appropriate to a performed exam including the following: Automated   exposure control, adjustments of the mA and/or kV according to patient size, or   use of iterative reconstruction technique. IV contrast: 100 cc Isovue 370       FINDINGS: Contrast-enhanced images were obtained, with MIP reformats. Left   shoulder arthroplasty. Postsurgical changes of right shoulder with radiodense   humeral head anchors present. Beam hardening artifacts from left hip   arthroplasty and high attenuation contrast and right brachiocephalic vein. No   focal thyroid lesion. No pathologic axillary adenopathy. Subcentimeter   mediastinal nodes. No evidence of the central pulmonary emboli. Peripheral   vessels are suboptimally evaluated due to respiratory motion artifacts. No   evidence of thoracic aortic aneurysm or dissection. No pleural or pericardial   effusion. Hiatus hernia with partially intrathoracic stomach. Prominent left   atrium without left atrial appendage thrombus. Cholecystectomy. As a   continuation, nonspecific, possibly related to airways disease. Degenerative   changes of the spine are noted.                CXR Results  (Last 48 hours)               02/18/22 1553  XR CHEST PORT Final result    Impression:  The cardiomediastinal silhouette is appropriate for age, technique,   and lung expansion. Pulmonary vasculature is not congested. The lungs are   essentially clear. No effusion or pneumothorax is seen. Localized elevation right hemidiaphragm noted       Narrative:  1 view                 Medical Decision Making and ED Course   - I am the first and primary provider for this patient AND AM THE PRIMARY PROVIDER OF RECORD. - I reviewed the vital signs, available nursing notes, past medical history, past surgical history, family history and social history. - Initial assessment performed. The patients presenting problems have been discussed, and the staff are in agreement with the care plan formulated and outlined with them. I have encouraged them to ask questions as they arise throughout their visit. Vital Signs-Reviewed the patient's vital signs. Patient Vitals for the past 24 hrs:   Temp Pulse Resp BP SpO2   02/19/22 1200  99      02/19/22 0808     98 %   02/19/22 0802 97.6 °F (36.4 °C) 82 18 (!) 117/54 98 %   02/19/22 0800  99      02/19/22 0050     96 %   02/19/22 0005 99 °F (37.2 °C) 92 20 127/73 94 %   02/18/22 2255     94 %   02/18/22 2231 98.2 °F (36.8 °C) 98 20 135/70 96 %   02/18/22 2112  96 16 132/63 90 %   02/18/22 2000  90 20  95 %   02/18/22 1942  95 18 123/74 91 %   02/18/22 1940     91 %       Records Reviewed: Nursing Notes and Old Medical Records    Provider Notes (Medical Decision Making):     Patient with past medical history as above presenting with shortness of breath, wheezing and rhonchi. Patient has tried outpatient therapy with steroids and antibiotics. Plan is to obtain blood work, administer bronchodilators and get a chest x-ray stratification. ED Course:   Patient somewhat improved. Still have some shortness of breath. She still has some shortness of breath. She failed ambulation trial with significant hypoxia.   Patient will get CTA and anticipated to be admitted to the hospital.    ED Course as of 02/19/22 1543   Fri Feb 18, 2022   2030 Received in sign out. Follow up CTA [LW]   2308 CTA neg for PE. Patient admitted. [LW]      ED Course User Index  [LW] Ismael Starr MD         Disposition     Disposition: Condition stable  Admitted to Floor Medical Floor the case was discussed with the admitting physician Dr. Blanche Lopez    Admitted      Diagnosis     Clinical Impression:   1. Moderate persistent reactive airway disease with acute exacerbation        Attestations: Tierra Murphy MD    Please note that this dictation was completed with [employer], the computer voice recognition software. Quite often unanticipated grammatical, syntax, homophones, and other interpretive errors are inadvertently transcribed by the computer software. Please disregard these errors. Please excuse any errors that have escaped final proofreading. Thank you.

## 2022-02-19 LAB
ALBUMIN SERPL-MCNC: 2.7 G/DL (ref 3.5–5)
ANION GAP SERPL CALC-SCNC: 7 MMOL/L (ref 5–15)
ARTERIAL PATENCY WRIST A: ABNORMAL
BASE EXCESS BLDA CALC-SCNC: 3.8 MMOL/L (ref 0–2)
BDY SITE: ABNORMAL
BUN SERPL-MCNC: 24 MG/DL (ref 6–20)
BUN/CREAT SERPL: 24 (ref 12–20)
CA-I BLD-MCNC: 9.6 MG/DL (ref 8.5–10.1)
CHLORIDE SERPL-SCNC: 103 MMOL/L (ref 97–108)
CO2 SERPL-SCNC: 27 MMOL/L (ref 21–32)
CREAT SERPL-MCNC: 1.01 MG/DL (ref 0.55–1.02)
ERYTHROCYTE [DISTWIDTH] IN BLOOD BY AUTOMATED COUNT: 13.3 % (ref 11.5–14.5)
GAS FLOW.O2 O2 DELIVERY SYS: 2 L/MIN
GLUCOSE SERPL-MCNC: 192 MG/DL (ref 65–100)
HCO3 BLDA-SCNC: 28 MMOL/L (ref 22–26)
HCT VFR BLD AUTO: 32 % (ref 35–47)
HGB BLD-MCNC: 10.3 G/DL (ref 11.5–16)
MCH RBC QN AUTO: 29.4 PG (ref 26–34)
MCHC RBC AUTO-ENTMCNC: 32.2 G/DL (ref 30–36.5)
MCV RBC AUTO: 91.4 FL (ref 80–99)
NRBC # BLD: 0 K/UL (ref 0–0.01)
NRBC BLD-RTO: 0 PER 100 WBC
PCO2 BLDA: 48 MMHG (ref 35–45)
PH BLDA: 7.4 [PH] (ref 7.35–7.45)
PHOSPHATE SERPL-MCNC: 3.5 MG/DL (ref 2.6–4.7)
PLATELET # BLD AUTO: 355 K/UL (ref 150–400)
PMV BLD AUTO: 10.2 FL (ref 8.9–12.9)
PO2 BLDA: 100 MMHG (ref 75–100)
POTASSIUM SERPL-SCNC: 3.5 MMOL/L (ref 3.5–5.1)
RBC # BLD AUTO: 3.5 M/UL (ref 3.8–5.2)
SAO2 % BLD: 99 %
SAO2% DEVICE SAO2% SENSOR NAME: ABNORMAL
SODIUM SERPL-SCNC: 137 MMOL/L (ref 136–145)
TSH SERPL DL<=0.05 MIU/L-ACNC: 0.14 UIU/ML (ref 0.36–3.74)
WBC # BLD AUTO: 18 K/UL (ref 3.6–11)

## 2022-02-19 PROCEDURE — 85027 COMPLETE CBC AUTOMATED: CPT

## 2022-02-19 PROCEDURE — 82803 BLOOD GASES ANY COMBINATION: CPT

## 2022-02-19 PROCEDURE — 74011250636 HC RX REV CODE- 250/636: Performed by: HOSPITALIST

## 2022-02-19 PROCEDURE — 74011250637 HC RX REV CODE- 250/637: Performed by: INTERNAL MEDICINE

## 2022-02-19 PROCEDURE — 77010033678 HC OXYGEN DAILY

## 2022-02-19 PROCEDURE — 74011250637 HC RX REV CODE- 250/637: Performed by: HOSPITALIST

## 2022-02-19 PROCEDURE — 36415 COLL VENOUS BLD VENIPUNCTURE: CPT

## 2022-02-19 PROCEDURE — 84443 ASSAY THYROID STIM HORMONE: CPT

## 2022-02-19 PROCEDURE — 94760 N-INVAS EAR/PLS OXIMETRY 1: CPT

## 2022-02-19 PROCEDURE — 74011000250 HC RX REV CODE- 250: Performed by: HOSPITALIST

## 2022-02-19 PROCEDURE — 74011636637 HC RX REV CODE- 636/637: Performed by: INTERNAL MEDICINE

## 2022-02-19 PROCEDURE — 36600 WITHDRAWAL OF ARTERIAL BLOOD: CPT

## 2022-02-19 PROCEDURE — 65270000029 HC RM PRIVATE

## 2022-02-19 PROCEDURE — 94640 AIRWAY INHALATION TREATMENT: CPT

## 2022-02-19 PROCEDURE — 80069 RENAL FUNCTION PANEL: CPT

## 2022-02-19 RX ORDER — PANTOPRAZOLE SODIUM 40 MG/1
40 TABLET, DELAYED RELEASE ORAL
Status: DISCONTINUED | OUTPATIENT
Start: 2022-02-20 | End: 2022-02-20 | Stop reason: HOSPADM

## 2022-02-19 RX ORDER — FAMOTIDINE 20 MG/1
40 TABLET, FILM COATED ORAL
Status: DISCONTINUED | OUTPATIENT
Start: 2022-02-19 | End: 2022-02-20 | Stop reason: HOSPADM

## 2022-02-19 RX ORDER — LOSARTAN POTASSIUM 50 MG/1
100 TABLET ORAL DAILY
Status: DISCONTINUED | OUTPATIENT
Start: 2022-02-19 | End: 2022-02-20 | Stop reason: HOSPADM

## 2022-02-19 RX ORDER — PREDNISONE 20 MG/1
20 TABLET ORAL 2 TIMES DAILY WITH MEALS
Status: DISCONTINUED | OUTPATIENT
Start: 2022-02-19 | End: 2022-02-20 | Stop reason: HOSPADM

## 2022-02-19 RX ORDER — HYDROCHLOROTHIAZIDE 25 MG/1
12.5 TABLET ORAL DAILY
Status: DISCONTINUED | OUTPATIENT
Start: 2022-02-19 | End: 2022-02-20 | Stop reason: HOSPADM

## 2022-02-19 RX ORDER — DEXTROMETHORPHAN POLISTIREX 30 MG/5ML
30 SUSPENSION ORAL
Status: DISCONTINUED | OUTPATIENT
Start: 2022-02-19 | End: 2022-02-20 | Stop reason: HOSPADM

## 2022-02-19 RX ORDER — AZITHROMYCIN 500 MG/1
500 TABLET, FILM COATED ORAL DAILY
Status: DISCONTINUED | OUTPATIENT
Start: 2022-02-20 | End: 2022-02-20 | Stop reason: HOSPADM

## 2022-02-19 RX ORDER — ACETAZOLAMIDE 500 MG/1
500 CAPSULE, EXTENDED RELEASE ORAL EVERY 6 HOURS
Status: DISPENSED | OUTPATIENT
Start: 2022-02-19 | End: 2022-02-19

## 2022-02-19 RX ADMIN — VENLAFAXINE HYDROCHLORIDE 150 MG: 75 CAPSULE, EXTENDED RELEASE ORAL at 08:45

## 2022-02-19 RX ADMIN — ASPIRIN 81 MG 81 MG: 81 TABLET ORAL at 00:13

## 2022-02-19 RX ADMIN — FUROSEMIDE 40 MG: 40 TABLET ORAL at 08:43

## 2022-02-19 RX ADMIN — ACETAZOLAMIDE 500 MG: 500 CAPSULE, EXTENDED RELEASE ORAL at 16:24

## 2022-02-19 RX ADMIN — DOCUSATE SODIUM 100 MG: 100 CAPSULE, LIQUID FILLED ORAL at 19:56

## 2022-02-19 RX ADMIN — IPRATROPIUM BROMIDE AND ALBUTEROL SULFATE 3 ML: .5; 3 SOLUTION RESPIRATORY (INHALATION) at 08:06

## 2022-02-19 RX ADMIN — ASPIRIN 81 MG 81 MG: 81 TABLET ORAL at 08:45

## 2022-02-19 RX ADMIN — IPRATROPIUM BROMIDE AND ALBUTEROL SULFATE 3 ML: .5; 3 SOLUTION RESPIRATORY (INHALATION) at 20:01

## 2022-02-19 RX ADMIN — POTASSIUM CHLORIDE 20 MEQ: 1500 TABLET, EXTENDED RELEASE ORAL at 00:13

## 2022-02-19 RX ADMIN — SODIUM CHLORIDE, PRESERVATIVE FREE 10 ML: 5 INJECTION INTRAVENOUS at 04:53

## 2022-02-19 RX ADMIN — POTASSIUM CHLORIDE 20 MEQ: 1500 TABLET, EXTENDED RELEASE ORAL at 19:56

## 2022-02-19 RX ADMIN — SODIUM CHLORIDE, PRESERVATIVE FREE 10 ML: 5 INJECTION INTRAVENOUS at 19:57

## 2022-02-19 RX ADMIN — FLUTICASONE PROPIONATE 2 SPRAY: 50 SPRAY, METERED NASAL at 01:11

## 2022-02-19 RX ADMIN — SODIUM CHLORIDE, PRESERVATIVE FREE 10 ML: 5 INJECTION INTRAVENOUS at 00:11

## 2022-02-19 RX ADMIN — TRAZODONE HYDROCHLORIDE 150 MG: 50 TABLET ORAL at 20:00

## 2022-02-19 RX ADMIN — ROPINIROLE HYDROCHLORIDE 1.5 MG: 1 TABLET, FILM COATED ORAL at 19:55

## 2022-02-19 RX ADMIN — POTASSIUM CHLORIDE 20 MEQ: 1500 TABLET, EXTENDED RELEASE ORAL at 08:43

## 2022-02-19 RX ADMIN — FLUTICASONE PROPIONATE 2 SPRAY: 50 SPRAY, METERED NASAL at 08:54

## 2022-02-19 RX ADMIN — ROPINIROLE HYDROCHLORIDE 1.5 MG: 1 TABLET, FILM COATED ORAL at 02:12

## 2022-02-19 RX ADMIN — DOCUSATE SODIUM 100 MG: 100 CAPSULE, LIQUID FILLED ORAL at 09:00

## 2022-02-19 RX ADMIN — IPRATROPIUM BROMIDE AND ALBUTEROL SULFATE 3 ML: .5; 3 SOLUTION RESPIRATORY (INHALATION) at 12:00

## 2022-02-19 RX ADMIN — ROPINIROLE HYDROCHLORIDE 0.5 MG: 0.25 TABLET, FILM COATED ORAL at 16:24

## 2022-02-19 RX ADMIN — FAMOTIDINE 40 MG: 20 TABLET, FILM COATED ORAL at 19:55

## 2022-02-19 RX ADMIN — MONTELUKAST 10 MG: 10 TABLET, FILM COATED ORAL at 08:44

## 2022-02-19 RX ADMIN — PANTOPRAZOLE SODIUM 40 MG: 40 TABLET, DELAYED RELEASE ORAL at 08:44

## 2022-02-19 RX ADMIN — PREDNISONE 20 MG: 20 TABLET ORAL at 16:25

## 2022-02-19 RX ADMIN — ROPINIROLE HYDROCHLORIDE 0.5 MG: 0.25 TABLET, FILM COATED ORAL at 08:44

## 2022-02-19 RX ADMIN — ASPIRIN 81 MG 81 MG: 81 TABLET ORAL at 19:55

## 2022-02-19 RX ADMIN — HYDROCHLOROTHIAZIDE 12.5 MG: 25 TABLET ORAL at 08:44

## 2022-02-19 RX ADMIN — FLUTICASONE PROPIONATE 2 SPRAY: 50 SPRAY, METERED NASAL at 19:58

## 2022-02-19 RX ADMIN — METHYLPREDNISOLONE SODIUM SUCCINATE 37.5 MG: 125 INJECTION, POWDER, FOR SOLUTION INTRAMUSCULAR; INTRAVENOUS at 08:54

## 2022-02-19 RX ADMIN — LOSARTAN POTASSIUM 100 MG: 50 TABLET, FILM COATED ORAL at 08:43

## 2022-02-19 RX ADMIN — BUDESONIDE AND FORMOTEROL FUMARATE DIHYDRATE 2 PUFF: 160; 4.5 AEROSOL RESPIRATORY (INHALATION) at 08:08

## 2022-02-19 NOTE — PROGRESS NOTES
Reason for Admission: COPD with acute exacerbation                       RUR Score: 11%                    Plan for utilizing home health: none at this time          PCP: First and Last name:  Yolande Reynolds MD     Name of Practice: [address] [place] [address],[address]    Are you a current patient: Yes/No: yes   Approximate date of last visit: within the past 2 months    Can you participate in a virtual visit with your PCP: no                    Current Advanced Directive/Advance Care Plan: Full Code      Healthcare Decision Maker:   Primary Healthcare Decision Maker: Rylie West () [phone], cell: [phone]  Click here to [address] including selection of the Healthcare Decision Maker Relationship (ie \"Primary\")                           Transition of Care Plan:  CM met with the patient and her  at the bedside to complete discharge planning assessment. Patient reported she lives at home in a one story house with her . She does not use oxygen at home but has a Bipap she wears at night. She also uses a nebulizer. Patient denies using any other assistive devices like a cane or a walker as she is able to perform her own ADL's and functions independently. She also drives herself to and from appointments as needed. We discussed potential discharge needs and she denies any at this time. Current plan is home self-care. CM will follow should she have any home oxygen needs.

## 2022-02-19 NOTE — PROGRESS NOTES
Hospitalist Progress Note               Daily Progress Note: 2/19/2022  Per hpi:82 y.o. female with a history of asthma for long time, vasculitis is and obstructive sleep apnea with a history of MAC infection presents to the emergency room due to continued shortness of breath. She is seen by the primary care physician given Levaquin and prednisone but did not get much improvement. States for several days that she started having shortness of breath that is gradually worsened. Denies any fever or chills. Denies any chest pain or palpitations. Has cough with productive sputum, now it is not much productive but cough severity is increased and constant. Admits wheezing. She is found with hypoxia with no previous home oxygen admitted for further management. Subjective: The patient is seen for follow  up. Feels some improvement,  Remains on 2 l/m, not on home o2,    Problem List:  Problem List as of 2/19/2022 Date Reviewed: 2/18/2022          Codes Class Noted - Resolved    Exacerbation of asthma ICD-10-CM: J45.901  ICD-9-CM: 493.92  2/18/2022 - Present        COPD with acute exacerbation ([facility] [address] ICD-10-CM: J44.1  ICD-9-CM: 491.21  2/18/2022 - Present        Subdural bleeding ([facility] [address] ICD-10-CM: I62.00  ICD-9-CM: 432.1  5/21/2021 - Present        Fall ICD-10-CM: W19. [facility]  ICD-9-CM: E888.9  5/21/2021 - Present              Medications reviewed  Current Facility-Administered Medications   Medication Dose Route Frequency    losartan (COZAAR) tablet 100 mg  100 mg Oral DAILY    And    hydroCHLOROthiazide (HYDRODIURIL) tablet 12.5 mg  12.5 mg Oral DAILY    [START ON 2/20/2022] pantoprazole (PROTONIX) tablet 40 mg  40 mg Oral ACB    famotidine (PEPCID) tablet 40 mg  40 mg Oral QHS    predniSONE (DELTASONE) tablet 20 mg  20 mg Oral BID WITH MEALS    [START ON 2/20/2022] azithromycin (ZITHROMAX) tablet 500 mg  500 mg Oral DAILY    acetaZOLAMIDE SR (DIAMOX) capsule 500 mg  500 mg Oral Q6H    montelukast (SINGULAIR) tablet 10 mg  10 mg Oral DAILY    venlafaxine-SR (EFFEXOR-XR) capsule 150 mg  150 mg Oral DAILY    rOPINIRole (REQUIP) tablet 0.5 mg  0.5 mg Oral BID    traZODone (DESYREL) tablet 150 mg  150 mg Oral QHS    gabapentin (NEURONTIN) capsule 100 mg  100 mg Oral DAILY    fluticasone propionate (FLONASE) 50 mcg/actuation nasal spray 2 Spray  2 Spray Both Nostrils BID    potassium chloride (K-DUR, KLOR-CON M20) SR tablet 20 mEq  20 mEq Oral BID    allopurinoL (ZYLOPRIM) tablet 150 mg  150 mg Oral DAILY    colchicine tablet 0.6 mg  0.6 mg Oral BID    aspirin chewable tablet 81 mg  81 mg Oral BID    rOPINIRole (REQUIP) tablet 1.5 mg  1.5 mg Oral QHS    furosemide (LASIX) tablet 40 mg  40 mg Oral DAILY    albuterol (PROVENTIL HFA, VENTOLIN HFA, PROAIR HFA) inhaler 2 Puff  2 Puff Inhalation Q4H PRN    sodium chloride (NS) flush 5-40 mL  5-40 mL IntraVENous Q8H    sodium chloride (NS) flush 5-40 mL  5-40 mL IntraVENous PRN    ondansetron (ZOFRAN ODT) tablet 4 mg  4 mg Oral Q6H PRN    docusate sodium (COLACE) capsule 100 mg  100 mg Oral BID    albuterol-ipratropium (DUO-NEB) 2.5 MG-0.5 MG/3 ML  3 mL Nebulization QID RT       Review of Systems:   A comprehensive review of systems was negative except for that written in the HPI. Objective:   Physical Exam:     Visit Vitals  BP (!) 117/54 (BP 1 Location: Right upper arm, BP Patient Position: At rest)   Pulse 99   Temp 97.6 °F (36.4 °C)   Resp 18   Ht 5' 2\" (1.575 m)   Wt 70.3 kg (155 lb)   SpO2 98%   Breastfeeding No   BMI 28.35 kg/m²    O2 Flow Rate (L/min): 2 l/min O2 Device: Nasal cannula    Temp (24hrs), Av.1 °F (36.7 °C), Min:97.4 °F (36.3 °C), Max:99 °F (37.2 °C)    No intake/output data recorded. 02/17 1901 -  0700  In: 0   Out: 300 [Urine:300]    General:  Alert, cooperative, no distress, appears stated age. Head:  Normocephalic, without obvious abnormality, atraumatic. Eyes:  Conjunctivae/corneas clear. PERRL, EOMs intact. Throat: Moist oral mucosa   Neck: Supple, symmetrical, trachea midline, no adenopathy, no JVD. Lungs:   Clear to auscultation bilaterally. diminished diffusely. Chest wall:  No tenderness or deformity. Heart:  Regular rate and rhythm, S1, S2 normal, no murmur, click, rub or gallop. Abdomen:   Soft, non-tender, not distended. Bowel sounds present, No rebound or guarding. Extremities: Extremities normal, atraumatic, no cyanosis. No edema   Pulses: 2+ and symmetric all extremities. Skin: Warm,dry   Neurologic: CNII-XII intact. No motor or sensory deficits.       Data Review:       Recent Days:  Recent Labs     02/19/22  0901 02/18/22  1547   WBC 18.0* 21.0*   HGB 10.3* 10.9*   HCT 32.0* 33.3*    337     Recent Labs     02/19/22  0901 02/18/22  1547    133*   K 3.5 3.4*    99   CO2 27 28   * 164*   BUN 24* 25*   CREA 1.01 1.19*   CA 9.6 9.9   PHOS 3.5  --    ALB 2.7* 2.7*   TBILI  --  0.3   ALT  --  47     Recent Labs     02/19/22  0558   PH 7.40   PCO2 48*   PO2 100   HCO3 28*       24 Hour Results:  Recent Results (from the past 24 hour(s))   EKG, 12 LEAD, INITIAL    Collection Time: 02/18/22  3:21 PM   Result Value Ref Range    Ventricular Rate 85 BPM    Atrial Rate 85 BPM    P-R Interval 186 ms    QRS Duration 62 ms    Q-T Interval 352 ms    QTC Calculation (Bezet) 418 ms    Calculated P Axis 75 degrees    Calculated R Axis -16 degrees    Calculated T Axis 66 degrees    Diagnosis       Normal sinus rhythm  Anteroseptal infarct , age undetermined  Abnormal ECG  No previous ECGs available  Confirmed by Earwilbert Moulton ([id number]) on 2/18/2022 8:57:50 PM     CBC WITH AUTOMATED DIFF    Collection Time: 02/18/22  3:47 PM   Result Value Ref Range    WBC 21.0 (H) 3.6 - 11.0 K/uL    RBC 3.67 (L) 3.80 - 5.20 M/uL    HGB 10.9 (L) 11.5 - 16.0 g/dL    HCT 33.3 (L) 35.0 - 47.0 %    MCV 90.7 80.0 - 99.0 FL    MCH 29.7 26.0 - 34.0 PG    MCHC 32.7 30.0 - 36.5 g/dL    RDW 13.2 11.5 - 14.5 % PLATELET 257 025 - 409 K/uL    MPV 9.9 8.9 - 12.9 FL    NRBC 0.0 0.0  WBC    ABSOLUTE NRBC 0.00 0.00 - 0.01 K/uL    NEUTROPHILS 90 (H) 32 - 75 %    LYMPHOCYTES 6 (L) 12 - 49 %    MONOCYTES 2 (L) 5 - 13 %    EOSINOPHILS 0 0 - 7 %    BASOPHILS 0 0 - 1 %    IMMATURE GRANULOCYTES 2 (H) 0 - 0.5 %    ABS. NEUTROPHILS 18.8 (H) 1.8 - 8.0 K/UL    ABS. LYMPHOCYTES 1.2 0.8 - 3.5 K/UL    ABS. MONOCYTES 0.5 0.0 - 1.0 K/UL    ABS. EOSINOPHILS 0.0 0.0 - 0.4 K/UL    ABS. BASOPHILS 0.1 0.0 - 0.1 K/UL    ABS. IMM. GRANS. 0.4 (H) 0.00 - 0.04 K/UL    DF AUTOMATED     METABOLIC PANEL, COMPREHENSIVE    Collection Time: 02/18/22  3:47 PM   Result Value Ref Range    Sodium 133 (L) 136 - 145 mmol/L    Potassium 3.4 (L) 3.5 - 5.1 mmol/L    Chloride 99 97 - 108 mmol/L    CO2 28 21 - 32 mmol/L    Anion gap 6 5 - 15 mmol/L    Glucose 164 (H) 65 - 100 mg/dL    BUN 25 (H) 6 - 20 mg/dL    Creatinine 1.19 (H) 0.55 - 1.02 mg/dL    BUN/Creatinine ratio 21 (H) 12 - 20      GFR est AA 53 (L) >60 ml/min/1.73m2    GFR est non-AA 44 (L) >60 ml/min/1.73m2    Calcium 9.9 8.5 - 10.1 mg/dL    Bilirubin, total 0.3 0.2 - 1.0 mg/dL    AST (SGOT) 37 15 - 37 U/L    ALT (SGPT) 47 12 - 78 U/L    Alk.  phosphatase 177 (H) 45 - 117 U/L    Protein, total 7.9 6.4 - 8.2 g/dL    Albumin 2.7 (L) 3.5 - 5.0 g/dL    Globulin 5.2 (H) 2.0 - 4.0 g/dL    A-G Ratio 0.5 (L) 1.1 - 2.2     LACTIC ACID    Collection Time: 02/18/22  3:47 PM   Result Value Ref Range    Lactic acid 1.5 0.4 - 2.0 mmol/L   CULTURE, BLOOD, PAIRED    Collection Time: 02/18/22  3:47 PM    Specimen: Blood   Result Value Ref Range    Special Requests: No Special Requests      Culture result: No growth after 3 hours     URINALYSIS W/ RFLX MICROSCOPIC    Collection Time: 02/18/22  4:29 PM   Result Value Ref Range    Color Yellow/Straw      Appearance Clear Clear      Specific gravity 1.011 1.003 - 1.030      pH (UA) 5.0 5.0 - 8.0      Protein Negative Negative mg/dL    Glucose Negative Negative mg/dL Ketone Negative Negative mg/dL    Bilirubin Negative Negative      Blood Negative Negative      Urobilinogen 0.1 0.1 - 1.0 EU/dL    Nitrites Negative Negative      Leukocyte Esterase Negative Negative      WBC 0-4 0 - 4 /hpf    RBC 0-5 0 - 5 /hpf    Bacteria Negative Negative /hpf    Mucus Trace /lpf   COVID-19 RAPID TEST    Collection Time: 02/18/22  8:02 PM   Result Value Ref Range    Specimen source Please find results under separate order      COVID-19 rapid test Not Detected Not Detected     BLOOD GAS, ARTERIAL    Collection Time: 02/19/22  5:58 AM   Result Value Ref Range    pH 7.40 7.35 - 7.45      PCO2 48 (H) 35 - 45 mmHg    PO2 100 75 - 100 mmHg    O2 SAT 99 >95 %    BICARBONATE 28 (H) 22 - 26 mmol/L    BASE EXCESS 3.8 (H) 0 - 2 mmol/L    O2 METHOD Nasal Cannula      O2 FLOW RATE 2 L/min    SITE Right Radial      YAMIL'S TEST PASS     CBC W/O DIFF    Collection Time: 02/19/22  9:01 AM   Result Value Ref Range    WBC 18.0 (H) 3.6 - 11.0 K/uL    RBC 3.50 (L) 3.80 - 5.20 M/uL    HGB 10.3 (L) 11.5 - 16.0 g/dL    HCT 32.0 (L) 35.0 - 47.0 %    MCV 91.4 80.0 - 99.0 FL    MCH 29.4 26.0 - 34.0 PG    MCHC 32.2 30.0 - 36.5 g/dL    RDW 13.3 11.5 - 14.5 %    PLATELET 363 790 - 100 K/uL    MPV 10.2 8.9 - 12.9 FL    NRBC 0.0 0.0  WBC    ABSOLUTE NRBC 0.00 0.00 - 0.01 K/uL   RENAL FUNCTION PANEL    Collection Time: 02/19/22  9:01 AM   Result Value Ref Range    Sodium 137 136 - 145 mmol/L    Potassium 3.5 3.5 - 5.1 mmol/L    Chloride 103 97 - 108 mmol/L    CO2 27 21 - 32 mmol/L    Anion gap 7 5 - 15 mmol/L    Glucose 192 (H) 65 - 100 mg/dL    BUN 24 (H) 6 - 20 mg/dL    Creatinine 1.01 0.55 - 1.02 mg/dL    BUN/Creatinine ratio 24 (H) 12 - 20      GFR est AA >60 >60 ml/min/1.73m2    GFR est non-AA 53 (L) >60 ml/min/1.73m2    Calcium 9.6 8.5 - 10.1 mg/dL    Phosphorus 3.5 2.6 - 4.7 mg/dL    Albumin 2.7 (L) 3.5 - 5.0 g/dL   TSH 3RD GENERATION    Collection Time: 02/19/22  9:01 AM   Result Value Ref Range    TSH 0.14 (L) 0.36 - 3.74 uIU/mL       chest X-ray    Assessment/     Patient Active Problem List   Diagnosis Code    Subdural bleeding ([facility] [address] I62.00    Fall W19. XXXA    Exacerbation of asthma J45. 0    COPD with acute exacerbation ([facility]) J44.1         -Acute copd exacerbation/asthma  -Acute bronchitis  -hx bronchiectasis/mac  -NELLY on cpap  -Chronic hypercapnic respiratory failure  -insomnia/anxiety  --[facility]  Plan:  Pulmonology appreciated,  Continue zithromax  Switch to oral prednisone  Continue albuterol nebs  Continue budesonide  Wean o2 to maintain spo2>90%  Continue supportive, anticipate may be able to go home am  Will need to check for home o2 in am.    Care Plan discussed with: Patient/Family, Nurse and     Total time spent with patient: 30 minutes. This dictation was done by dragon, computer voice recognition software. Often unanticipated grammatical, syntax, phones and other interpretive errors are inadvertently transcribed. Please excuse errors that have escaped final proofreading.     Angelica Tan MD

## 2022-02-19 NOTE — ROUTINE PROCESS
Patient came to floor with copd hypoxia. She is A & O x 4, ambulatory, I have put the yellow socks on her and red arm band. Her admit skin assessment done by [facility]. And Axel ARELLANO And the patient does not have any breakdown noted.

## 2022-02-19 NOTE — ED NOTES
Pt resting and stable with family at bedside. t room air saturation has fluctuated from 88-95%. Pt at rest has desaturated to below 90% and was placed on supplemental oxygen at 2 pm via NC. Pt was not in any distress. Pt has received multiple nebulizers with minimal effect. Per admitting MD pt is to be placed on BiPAP when she is ready for sleep.

## 2022-02-19 NOTE — H&P
History and Physical              Subjective :   Chief Complaint : Shortness of breath with history of asthma for long time    Source of information : Patient, spouse at bedside. History of present illness:   81 y.o. female with a history of asthma for long time, vasculitis is and obstructive sleep apnea with a history of MAC infection presents to the emergency room due to continued shortness of breath. She is seen by the primary care physician given Levaquin and prednisone but did not get much improvement. States for several days that she started having shortness of breath that is gradually worsened. Denies any fever or chills. Denies any chest pain or palpitations. Has cough with productive sputum, now it is not much productive but cough severity is increased and constant. Admits wheezing. She is found with hypoxia with no previous home oxygen admitted for further management. Patient not very happy to stay but she is explained about it.     Past Medical History:   Diagnosis Date    Arthritis     hands    Asthma     Bronchiectasis (HCC)     Chronic pain     back (pt denies)    GERD (gastroesophageal reflux disease)     Hypertension     MAC (mycobacterium avium-intracellulare complex)     Other ill-defined conditions(799.89)     MAC- mycobacterium avium complex    Other ill-defined conditions(799.89)     restless leg syndrome    Other ill-defined conditions(799.89)     bronchiectasis    Psychiatric disorder     anxiety    Unspecified sleep apnea     wears CPAP at night     Past Surgical History:   Procedure Laterality Date    HX APPENDECTOMY      HX CHOLECYSTECTOMY      HX HYSTERECTOMY  2002    HX MOHS PROCEDURES Right     HX ORTHOPAEDIC Left 2002    hemiarthrectomy    HX ORTHOPAEDIC Right     bunionectomy, hammer toe repair    HX ORTHOPAEDIC Left     3 shoulder surgeries - last was a reverse shoulder replacement    HX TONSILLECTOMY      VA BREAST SURGERY PROCEDURE UNLISTED      breast reductions     Family History   Problem Relation Age of Onset   Jalloh Stroke Mother     Lung Disease Father     Cancer Sister         bladder    OSTEOARTHRITIS Sister     OSTEOARTHRITIS Sister     Hypertension Sister     Heart Disease Sister     Other Brother         dies with complications with cholecystectomy      Social History     Tobacco Use    Smoking status: Never Smoker    Smokeless tobacco: Never Used   Substance Use Topics    Alcohol use: No       Prior to Admission medications    Medication Sig Start Date End Date Taking? Authorizing Provider   losartan-hydroCHLOROthiazide (HYZAAR) 100-12.5 mg per tablet Take 1 Tablet by mouth daily. Yes Provider, Historical   rOPINIRole (REQUIP) 0.5 mg tablet Take 1.5 mg by mouth nightly. Yes Provider, Historical   pantoprazole (Protonix) 40 mg tablet Take 40 mg by mouth daily. Indications: gastroesophageal reflux disease   Yes Provider, Historical   furosemide (LASIX) 40 mg tablet Take 40 mg by mouth daily. Yes Provider, Historical   albuterol (ProAir HFA) 90 mcg/actuation inhaler Take 2 Puffs by inhalation every four (4) hours as needed for Wheezing or Shortness of Breath. Yes Provider, Historical   calcium citrate/vitamin D3 (CITRACAL + D PO) Take 1 Tablet by mouth two (2) times a day. Yes Provider, Historical   MAGNESIUM GLYCINATE PO Take 665 mg by mouth every evening. Yes Provider, Historical   iron 18 mg tab Take 1 Tablet by mouth daily. Yes Provider, Historical   biotin 5,000 mcg subl 1 Tablet by SubLINGual route every evening. Yes Provider, Historical   allopurinoL (ZYLOPRIM) 300 mg tablet Take 150 mg by mouth daily. Provider, Historical   colchicine 0.6 mg tablet Take 0.6 mg by mouth two (2) times a day. Provider, Historical   aspirin 81 mg chewable tablet Take 81 mg by mouth two (2) times a day. Provider, Historical   azelastine (OPTIVAR) 0.05 % ophthalmic solution Administer 1 Drop to both eyes two (2) times a day.     Provider, Historical   metOLazone (ZAROXOLYN) 5 mg tablet Take 5 mg by mouth daily. Provider, Historical   famotidine (PEPCID) 20 mg tablet Take 20 mg by mouth nightly. Provider, Historical   arformoterol (BROVANA) 15 mcg/2 mL nebu neb solution 15 mcg by Nebulization route two (2) times a day. Provider, Historical   budesonide (PULMICORT) 0.5 mg/2 mL nebulizer suspension 500 mcg by Nebulization route two (2) times a day. Provider, Historical   ipratropium (ATROVENT) 0.02 % nebulizer solution by Nebulization route two (2) times a day. Can use every 4 hrs as needed but does use it twice daily. Provider, Historical   potassium chloride (KLOR-CON M20) 20 mEq tablet Take 20 mEq by mouth two (2) times a day. Provider, Historical   montelukast (SINGULAIR) 10 mg tablet Take 10 mg by mouth daily. Provider, Historical   VENLAFAXINE HCL (EFFEXOR XR PO) Take 150 mg by mouth daily. Provider, Historical   rOPINIRole (REQUIP) 0.5 mg tablet Take 0.5 mg by mouth two (2) times a day. 1 tab in am, one tab in the middle of the day, and 3 tabs at night. Provider, Historical   traZODone (DESYREL) 100 mg tablet Take 150 mg by mouth nightly. Provider, Historical   gabapentin (NEURONTIN) 100 mg capsule Take 100 mg by mouth daily. Provider, Historical   fluticasone (FLONASE) 50 mcg/actuation nasal spray 2 Sprays by Both Nostrils route two (2) times a day. Provider, Historical     Allergies   Allergen Reactions    Latex Other (comments)     Flu like symptoms    Clindamycin Nausea Only    Darvocet A500 [Propoxyphene N-Acetaminophen] Unknown (comments)     Not sure patient states it was on her 'chart'    Penicillins Rash     Not really sure patient had bruises at the time    Reglan [Metoclopramide] Other (comments)     Made patient feel like a 'zombie'             Review of Systems:  Constitutional: Appetite is fair, denies weight loss. no fever, no chills, no night sweats.   Eye: No recent visual disturbances, no discharge, no double vision. Ear/nose/mouth/throat : No hearing disturbance, no ear pain, no nasal congestion, no sore throat, no trouble swallowing. Respiratory : +++ trouble breathing, +++ cough, +++ shortness of breath, no hemoptysis, no wheezing. Cardiovascular : No chest pain, no palpitation,  no orthopnea, ++ peripheral edema. Gastrointestinal : No nausea, no vomiting, ++constipation,  No abdominal pain. Genitourinary : No dysuria, no hematuria, no increased frequency, No incontinence. Lymphatics : No swollen glands -Neck, axillary, inguinal.  Endocrine : No excessive thirst, No polyuria No cold intolerance, No heat intolerance. Immunologic : No hives, urticaria, No seasonal allergies. Musculoskeletal : No joint swelling, No pain, No effusion,   Integumentary : No rash, No pruritus, No ecchymosis. Hematology : No petechiae, No easy bruising,  No tendency to bleed easy. Neurology : Denies change in mental status, No abnormal balance, + headache, No confusion, No numbness or tingling. Psychiatric : No mood swings, No anxiety, No depression. Vitals:     Patient Vitals for the past 12 hrs:   Temp Pulse Resp BP SpO2   02/18/22 2112  96 16 132/63 90 %   02/18/22 2000  90 20  95 %   02/18/22 1942  95 18 123/74 91 %   02/18/22 1940     91 %   02/18/22 1519 97.4 °F (36.3 °C) 88 24 133/68 90 %       Physical Exam:   General : Looks tired, appears in mild respiratory distress. Lucia Fanalda HEENT : PERRLA, normal oral mucosa, atraumatic normocephalic, Normal ear and nose. Neck : Supple, no JVD, no masses noted, no carotid bruit. Lungs : Not using accessory muscles, chest expansion decreased, symmetrical.  Decreased air movement throughout the chest,  CVS : Rhythm rate regular, S1+, S2+, no murmur or gallop. Abdomen : Soft, nontender,  bowel sounds active. Extremities : No edema noted,  pedal pulses not palpable.   Musculoskeletal : Fair range of motion, no joint swelling or effusion, muscle tone and power appears fair. Skin : Moist, warm,  no pathological rash. Lymphatic : No cervical lymphadenopathy. Neurological : Awake, alert, oriented to time place person. No neurological deficits. Psychiatric : Mood and affect appears anxious. Data Review:   Recent Results (from the past 24 hour(s))   EKG, 12 LEAD, INITIAL    Collection Time: 02/18/22  3:21 PM   Result Value Ref Range    Ventricular Rate 85 BPM    Atrial Rate 85 BPM    P-R Interval 186 ms    QRS Duration 62 ms    Q-T Interval 352 ms    QTC Calculation (Bezet) 418 ms    Calculated P Axis 75 degrees    Calculated R Axis -16 degrees    Calculated T Axis 66 degrees    Diagnosis       Normal sinus rhythm  Anteroseptal infarct , age undetermined  Abnormal ECG  No previous ECGs available  Confirmed by [employer] Lopez ([id number]) on 2/18/2022 8:57:50 PM     CBC WITH AUTOMATED DIFF    Collection Time: 02/18/22  3:47 PM   Result Value Ref Range    WBC 21.0 (H) 3.6 - 11.0 K/uL    RBC 3.67 (L) 3.80 - 5.20 M/uL    HGB 10.9 (L) 11.5 - 16.0 g/dL    HCT 33.3 (L) 35.0 - 47.0 %    MCV 90.7 80.0 - 99.0 FL    MCH 29.7 26.0 - 34.0 PG    MCHC 32.7 30.0 - 36.5 g/dL    RDW 13.2 11.5 - 14.5 %    PLATELET 726 836 - 035 K/uL    MPV 9.9 8.9 - 12.9 FL    NRBC 0.0 0.0  WBC    ABSOLUTE NRBC 0.00 0.00 - 0.01 K/uL    NEUTROPHILS 90 (H) 32 - 75 %    LYMPHOCYTES 6 (L) 12 - 49 %    MONOCYTES 2 (L) 5 - 13 %    EOSINOPHILS 0 0 - 7 %    BASOPHILS 0 0 - 1 %    IMMATURE GRANULOCYTES 2 (H) 0 - 0.5 %    ABS. NEUTROPHILS 18.8 (H) 1.8 - 8.0 K/UL    ABS. LYMPHOCYTES 1.2 0.8 - 3.5 K/UL    ABS. MONOCYTES 0.5 0.0 - 1.0 K/UL    ABS. EOSINOPHILS 0.0 0.0 - 0.4 K/UL    ABS. BASOPHILS 0.1 0.0 - 0.1 K/UL    ABS. IMM.  GRANS. 0.4 (H) 0.00 - 0.04 K/UL    DF AUTOMATED     METABOLIC PANEL, COMPREHENSIVE    Collection Time: 02/18/22  3:47 PM   Result Value Ref Range    Sodium 133 (L) 136 - 145 mmol/L    Potassium 3.4 (L) 3.5 - 5.1 mmol/L    Chloride 99 97 - 108 mmol/L    CO2 28 21 - 32 mmol/L    Anion gap 6 5 - 15 mmol/L    Glucose 164 (H) 65 - 100 mg/dL    BUN 25 (H) 6 - 20 mg/dL    Creatinine 1.19 (H) 0.55 - 1.02 mg/dL    BUN/Creatinine ratio 21 (H) 12 - 20      GFR est AA 53 (L) >60 ml/min/1.73m2    GFR est non-AA 44 (L) >60 ml/min/1.73m2    Calcium 9.9 8.5 - 10.1 mg/dL    Bilirubin, total 0.3 0.2 - 1.0 mg/dL    AST (SGOT) 37 15 - 37 U/L    ALT (SGPT) 47 12 - 78 U/L    Alk. phosphatase 177 (H) 45 - 117 U/L    Protein, total 7.9 6.4 - 8.2 g/dL    Albumin 2.7 (L) 3.5 - 5.0 g/dL    Globulin 5.2 (H) 2.0 - 4.0 g/dL    A-G Ratio 0.5 (L) 1.1 - 2.2     LACTIC ACID    Collection Time: 02/18/22  3:47 PM   Result Value Ref Range    Lactic acid 1.5 0.4 - 2.0 mmol/L   URINALYSIS W/ RFLX MICROSCOPIC    Collection Time: 02/18/22  4:29 PM   Result Value Ref Range    Color Yellow/Straw      Appearance Clear Clear      Specific gravity 1.011 1.003 - 1.030      pH (UA) 5.0 5.0 - 8.0      Protein Negative Negative mg/dL    Glucose Negative Negative mg/dL    Ketone Negative Negative mg/dL    Bilirubin Negative Negative      Blood Negative Negative      Urobilinogen 0.1 0.1 - 1.0 EU/dL    Nitrites Negative Negative      Leukocyte Esterase Negative Negative      WBC 0-4 0 - 4 /hpf    RBC 0-5 0 - 5 /hpf    Bacteria Negative Negative /hpf    Mucus Trace /lpf   COVID-19 RAPID TEST    Collection Time: 02/18/22  8:02 PM   Result Value Ref Range    Specimen source Please find results under separate order      COVID-19 rapid test Not Detected Not Detected         Radiologic Studies :   CT Results  (Last 48 hours)               02/18/22 2026  CTA CHEST W OR W WO CONT Final result    Impression:  Suboptimal due to respiratory motion artifacts. As visualized, no CT   findings of acute pulmonary embolus.        Narrative:  Dose Reduction:    All CT scans at this facility are performed using dose reduction optimization   techniques as appropriate to a performed exam including the following: Automated   exposure control, adjustments of the mA and/or kV according to patient size, or   use of iterative reconstruction technique. IV contrast: 100 cc Isovue 370       FINDINGS: Contrast-enhanced images were obtained, with MIP reformats. Left   shoulder arthroplasty. Postsurgical changes of right shoulder with radiodense   humeral head anchors present. Beam hardening artifacts from left hip   arthroplasty and high attenuation contrast and right brachiocephalic vein. No   focal thyroid lesion. No pathologic axillary adenopathy. Subcentimeter   mediastinal nodes. No evidence of the central pulmonary emboli. Peripheral   vessels are suboptimally evaluated due to respiratory motion artifacts. No   evidence of thoracic aortic aneurysm or dissection. No pleural or pericardial   effusion. Hiatus hernia with partially intrathoracic stomach. Prominent left   atrium without left atrial appendage thrombus. Cholecystectomy. As a   continuation, nonspecific, possibly related to airways disease. Degenerative   changes of the spine are noted. CXR Results  (Last 48 hours)               02/18/22 1553  XR CHEST PORT Final result    Impression:  The cardiomediastinal silhouette is appropriate for age, technique,   and lung expansion. Pulmonary vasculature is not congested. The lungs are   essentially clear. No effusion or pneumothorax is seen. Localized elevation right hemidiaphragm noted       Narrative:  1 view                   Assessment and Plan :       Exacerbation of chronic obstructive asthma: Started on IV steroids, nebulizer treatments and steroid inhalers. Patient is explained about importance of the treatment, also recommended changing inhalers. Will request pulmonary consultation.     History of gouty arthritis: On allopurinol which we will continue    Insomnia with severe anxiety: On multiple medications taking at this time which we will continue    Benign essential hypertension: Continue home medications    Obstructive sleep apnea syndrome: On BiPAP at home as per , he is going to bring it to use. Admitted to medical floor, has no advance medical directives, spouse will make decisions in case if he cannot. Home medications reviewed with the list he provided a clean the father's that we have in the system. CC : Susanna Cartagena MD  Signed By: Leonel Bass MD     February 18, 2022      This dictation was done by dragon, computer voice recognition software. Often unanticipated grammatical, syntax, [place] phones and other interpretive errors are inadvertently transcribed. Please excuse errors that have escaped final proofreading.

## 2022-02-19 NOTE — CONSULTS
Consult  Pulmonary, Critical Care    Name: Jazmyne Julian MRN: [id number]   : 1940 Hospital: [address] [place] [address]   Date: 2022  Admission date: 2022 Hospital Day: 2       Subjective/Interval History:   Patient seen on the medical floor. Normally followed [facility] for underlying COPD. Not on oxygen. She normally takes nebulized albuterol Atrovent twice daily with budesonide. She will use nebulized albuterol Atrovent 4 times a day if she has problems. Over the last 2 weeks she has been having worsening problems initially was placed on doxycycline but it upset her stomach. She was then placed on Levaquin continued to have cough worsening shortness of breath some yellow sputum low-grade temperature up to 100. She called her pulmonologist who placed her on prednisone and something else but that was yesterday she had continued worsening came to the emergency room and was admitted    Hospital Problems  Date Reviewed: 2022          Codes Class Noted POA    Exacerbation of asthma ICD-10-CM: J45.901  ICD-9-CM: 493.92  2022 Yes        COPD with acute exacerbation ([facility] ICD-10-CM: J44.1  ICD-9-CM: 491.21  2022 Unknown              IMPRESSION:   1. Acute bronchitis  2. Acute exacerbation COPD  3. Chronic hypercapnic respiratory failure  4. Gastroesophageal reflux  5. Obstructive sleep apnea on home CPAP  6. Body mass index is 28.35 kg/m². 7.       RECOMMENDATIONS/PLAN:   1. Would add another antibiotic Zithromax be easier to take  2. We will continue her nebulized albuterol Atrovent 4 times daily  3. We will discontinue Symbicort and place on budesonide like she normally takes at home  4. Improved today we will discontinue Solu-Medrol and placed on prednisone  5.  We will attempt to collect sputum for culture          [x] High complexity decision making was performed  [x] See my orders for details      Subjective/Initial History:     I was asked by Darius Uribe MD to see Tu Acevedo  a 81 y.o.    female in consultation for a chief complaint of acute exacerbation COPD with bronchitis        Allergies   Allergen Reactions    Latex Other (comments)     Flu like symptoms    Clindamycin Nausea Only    Darvocet A500 [Propoxyphene N-Acetaminophen] Unknown (comments)     Not sure patient states it was on her 'chart'    Penicillins Rash     Not really sure patient had bruises at the time    Reglan [Metoclopramide] Other (comments)     Made patient feel like a 'zombie'        MAR reviewed and pertinent medications noted or modified as needed     Current Facility-Administered Medications   Medication    losartan (COZAAR) tablet 100 mg    And    hydroCHLOROthiazide (HYDRODIURIL) tablet 12.5 mg    [START ON 2/20/2022] pantoprazole (PROTONIX) tablet 40 mg    famotidine (PEPCID) tablet 40 mg    predniSONE (DELTASONE) tablet 20 mg    montelukast (SINGULAIR) tablet 10 mg    venlafaxine-SR (EFFEXOR-XR) capsule 150 mg    rOPINIRole (REQUIP) tablet 0.5 mg    traZODone (DESYREL) tablet 150 mg    gabapentin (NEURONTIN) capsule 100 mg    fluticasone propionate (FLONASE) 50 mcg/actuation nasal spray 2 Spray    potassium chloride (K-DUR, KLOR-CON M20) SR tablet 20 mEq    allopurinoL (ZYLOPRIM) tablet 150 mg    colchicine tablet 0.6 mg    aspirin chewable tablet 81 mg    rOPINIRole (REQUIP) tablet 1.5 mg    furosemide (LASIX) tablet 40 mg    albuterol (PROVENTIL HFA, VENTOLIN HFA, PROAIR HFA) inhaler 2 Puff    sodium chloride (NS) flush 5-40 mL    sodium chloride (NS) flush 5-40 mL    ondansetron (ZOFRAN ODT) tablet 4 mg    docusate sodium (COLACE) capsule 100 mg    albuterol-ipratropium (DUO-NEB) 2.5 MG-0.5 MG/3 ML      Patient PCP: Symone Cummins MD  [facility]:  has a past medical history of Arthritis, Asthma, Bronchiectasis ([address] Chronic pain, GERD (gastroesophageal reflux disease), Hypertension, MAC (mycobacterium avium-intracellulare complex), Other ill-defined conditions(799.89), Other ill-defined conditions(799.89), Other ill-defined conditions(799.89), Psychiatric disorder, and Unspecified sleep apnea. PSH:   has a past surgical history that includes hx hysterectomy (2002); hx tonsillectomy; hx appendectomy; pr breast surgery procedure unlisted; hx orthopaedic (Left, 2002); hx orthopaedic (Right); hx orthopaedic (Left); hx mohs procedure (Right); and hx cholecystectomy. FHX: family history includes Cancer in her sister; Heart Disease in her sister; Hypertension in her sister; Lung Disease in her father; OSTEOARTHRITIS in her sister and sister; Other in her brother; Stroke in her mother. SHX:  reports that she has never smoked. She has never used smokeless tobacco. She reports that she does not drink alcohol and does not use drugs. Systemic review:  General weight stable no chronic fever chills or sweats  Eyes no double vision or momentary blindness  ENT no sinus congestion drainage or facial pain  Musculoskeletal she states she always has muscle aches and pains but nothing out of the ordinary  Endocrinologic no polyuria polydipsia  Neurologic no seizures or syncope  Gastrointestinal chronic gastroesophageal reflux. She takes Protonix in the morning and Pepcid at bedtime and has fair control.   She is known to have a large hiatal hernia and continues to have occasional problems even with her medications  Genitourinary no pain or discomfort on urination no bleeding  Cardiovascular no history of heart disease heart attack chest pain diaphoresis or ankle edema  Respiratory as mentioned worsening shortness of breath over the last 2 weeks with cough some yellow sputum and some wheezing    Objective:     Vital Signs: Telemetry:    normal sinus rhythm Intake/Output:   Visit Vitals  BP (!) 117/54 (BP 1 Location: Right upper arm, BP Patient Position: At rest)   Pulse 99   Temp 97.6 °F (36.4 °C)   Resp 18   Ht 5' 2\" (1.575 m)   Wt 70.3 kg (155 lb)   SpO2 98%   Breastfeeding No   BMI 28.35 kg/m²       Temp (24hrs), Av.1 °F (36.7 °C), Min:97.4 °F (36.3 °C), Max:99 °F (37.2 °C)        O2 Device: Nasal cannula O2 Flow Rate (L/min): 2 l/min       Wt Readings from Last 4 Encounters:   22 70.3 kg (155 lb)   21 75.8 kg (167 lb)   16 82.6 kg (182 lb 3.2 oz)   02/23/15 79.8 kg (176 lb)          Intake/Output Summary (Last 24 hours) at 2022 1307  Last data filed at 2022 0113  Gross per 24 hour   Intake 0 ml   Output 300 ml   Net -300 ml       Last shift:      No intake/output data recorded. Last 3 shifts:  1901 -  0700  In: 0   Out: 300 [Urine:300]       Physical Exam:   General:  female; being up on the side of the bed eating lunch in no distress currently on nasal oxygen 2  HEENT: NCAT, normal oral mucosa  Eyes: anicteric; conjunctiva clear extraocular movements intact  Neck: no nodes, no JVD, no accessory MM use. Chest: no deformity,   Cardiac: Regular rate and rhythm no murmur  Lungs: Fair at best air movement with prolongation of exhalation very coarse exhalation just a few wheezes no definite rales  Abd: Soft nontender normal bowel sounds  Ext: no edema; no joint swelling;  No clubbing  : clear urine  Neuro: Awake alert oriented speech is clear moves all 4 extremities  Psych- no agitation, oriented to person;   Skin: warm, dry, no cyanosis;   Pulses: Brachial and radial pulses intact  Capillary: Normal capillary refill    Labs:    Recent Labs     22  0901 22  1547   WBC 18.0* 21.0*   HGB 10.3* 10.9*    337     Recent Labs     22  0901 22  1547    133*   K 3.5 3.4*    99   CO2 27 28   * 164*   BUN 24* 25*   CREA 1.01 1.19*   CA 9.6 9.9   PHOS 3.5  --    LAC  --  1.5   ALB 2.7* 2.7*   ALT  --  47     Recent Labs     22  0558   PH 7.40   PCO2 48*   PO2 100   HCO3 28*   2 L nasal oxygen  COVID-19 antigen negative  Room air oxygen saturation 91%  Lab Results   Component Value Date/Time    Culture result: No growth after 1 hour 02/18/2022 03:47 PM    Culture result: NO GROWTH 2 DAYS 04/04/2016 02:05 PM    Culture result: MIXED SKIN MIGUE ISOLATED 12/11/2013 11:13 AM     Lab Results   Component Value Date/Time    TSH 0.14 (L) 02/19/2022 09:01 AM       Imaging:    CXR Results  (Last 48 hours)               02/18/22 1553  XR CHEST PORT Final result    Impression:  The cardiomediastinal silhouette is appropriate for age, technique,   and lung expansion. Pulmonary vasculature is not congested. The lungs are   essentially clear. No effusion or pneumothorax is seen. Localized elevation right hemidiaphragm noted       Narrative:  1 view               Results from Hospital Encounter encounter on 02/18/22    XR CHEST PORT    Narrative  1 view    Impression  The cardiomediastinal silhouette is appropriate for age, technique,  and lung expansion. Pulmonary vasculature is not congested. The lungs are  essentially clear. No effusion or pneumothorax is seen. Localized elevation right hemidiaphragm noted      Results from Hospital Encounter encounter on 05/21/21    XR FOREARM RT AP/LAT    Narrative  Radiograph of the right forearm, 2 views    INDICATION: Fall, injury    COMPARISON: None    Impression  Findings/impression:  Dorsal soft tissue swelling of the proximal forearm. No definite acute fracture  or subluxation. Significant degenerative changes of the first carpometacarpal  joint. XR HIP RT W OR WO PELV 2-3 VWS    Narrative  Radiograph of the right hip, 2 views    INDICATION: Fall, injury    COMPARISON: None    FINDINGS:  No acute fracture or subluxation. No aggressive osseous lesions. Soft tissues  are radiographically normal. Presumed vascular phleboliths of the right pelvis. Degenerative changes of the lower thoracic spine. Impression  No acute fracture or subluxation.     Results from Hospital Encounter encounter on 02/18/22    CTA CHEST W OR W WO CONT    Narrative  Dose Reduction:  All CT scans at this facility are performed using dose reduction optimization  techniques as appropriate to a performed exam including the following: Automated  exposure control, adjustments of the mA and/or kV according to patient size, or  use of iterative reconstruction technique. IV contrast: 100 cc Isovue 370    FINDINGS: Contrast-enhanced images were obtained, with MIP reformats. Left  shoulder arthroplasty. Postsurgical changes of right shoulder with radiodense  humeral head anchors present. Beam hardening artifacts from left hip  arthroplasty and high attenuation contrast and right brachiocephalic vein. No  focal thyroid lesion. No pathologic axillary adenopathy. Subcentimeter  mediastinal nodes. No evidence of the central pulmonary emboli. Peripheral  vessels are suboptimally evaluated due to respiratory motion artifacts. No  evidence of thoracic aortic aneurysm or dissection. No pleural or pericardial  effusion. Hiatus hernia with partially intrathoracic stomach. Prominent left  atrium without left atrial appendage thrombus. Cholecystectomy. As a  continuation, nonspecific, possibly related to airways disease. Degenerative  changes of the spine are noted. Impression  Suboptimal due to respiratory motion artifacts. As visualized, no CT  findings of acute pulmonary embolus. Discussion: Patient with underlying COPD normally not on any oxygen has had an acute exacerbation probably an acute bronchitis. Did not improve with doxycycline or Levaquin. Her baseline medications include nebulized albuterol Atrovent and budesonide we will resume them discontinue Symbicort. She is feeling better we will switch Solu-Medrol to oral prednisone. Will attempt to collect sputum. Will place on Zithromax and follow WBC count. Her blood gas shows PCO2 of 48 with a normal pH however her PO2 was 100.   I have decreased oxygen to 1 L we will check overnight oximetry on CPAP with no oxygen supplementation and then repeat an ABG in a..   Ivan Escalera MD normal...

## 2022-02-19 NOTE — ED NOTES
TRANSFER - OUT REPORT:    Verbal report given to Yue Singer RN(name) on Monica Gist  being transferred to [facility](unit) for routine progression of care       Report consisted of patients Situation, Background, Assessment and   Recommendations(SBAR). Information from the following report(s) SBAR was reviewed with the receiving nurse. Lines:   Peripheral IV 02/18/22 Posterior;Right Hand (Active)        Opportunity for questions and clarification was provided.       Patient transported with:   Monitor  O2 @ 2 liters  Tech

## 2022-02-20 VITALS
HEART RATE: 71 BPM | OXYGEN SATURATION: 92 % | RESPIRATION RATE: 16 BRPM | TEMPERATURE: 98.2 F | HEIGHT: 62 IN | SYSTOLIC BLOOD PRESSURE: 119 MMHG | DIASTOLIC BLOOD PRESSURE: 68 MMHG | WEIGHT: 155 LBS | BODY MASS INDEX: 28.52 KG/M2

## 2022-02-20 LAB
ANION GAP SERPL CALC-SCNC: 9 MMOL/L (ref 5–15)
ARTERIAL PATENCY WRIST A: ABNORMAL
BASE EXCESS BLDA CALC-SCNC: 3.4 MMOL/L (ref 0–2)
BASOPHILS # BLD: 0.1 K/UL (ref 0–0.1)
BASOPHILS NFR BLD: 0 % (ref 0–1)
BDY SITE: ABNORMAL
BUN SERPL-MCNC: 35 MG/DL (ref 6–20)
BUN/CREAT SERPL: 28 (ref 12–20)
CA-I BLD-MCNC: 9.5 MG/DL (ref 8.5–10.1)
CHLORIDE SERPL-SCNC: 106 MMOL/L (ref 97–108)
CO2 SERPL-SCNC: 27 MMOL/L (ref 21–32)
CREAT SERPL-MCNC: 1.26 MG/DL (ref 0.55–1.02)
DIFFERENTIAL METHOD BLD: ABNORMAL
EOSINOPHIL # BLD: 0 K/UL (ref 0–0.4)
EOSINOPHIL NFR BLD: 0 % (ref 0–7)
ERYTHROCYTE [DISTWIDTH] IN BLOOD BY AUTOMATED COUNT: 13.7 % (ref 11.5–14.5)
FIO2 ON VENT: 21 %
GLUCOSE SERPL-MCNC: 116 MG/DL (ref 65–100)
HCO3 BLDA-SCNC: 27 MMOL/L (ref 22–26)
HCT VFR BLD AUTO: 32.7 % (ref 35–47)
HGB BLD-MCNC: 10.6 G/DL (ref 11.5–16)
IMM GRANULOCYTES # BLD AUTO: 0.6 K/UL (ref 0–0.04)
IMM GRANULOCYTES NFR BLD AUTO: 3 % (ref 0–0.5)
LYMPHOCYTES # BLD: 2.1 K/UL (ref 0.8–3.5)
LYMPHOCYTES NFR BLD: 10 % (ref 12–49)
MCH RBC QN AUTO: 29.9 PG (ref 26–34)
MCHC RBC AUTO-ENTMCNC: 32.4 G/DL (ref 30–36.5)
MCV RBC AUTO: 92.4 FL (ref 80–99)
MONOCYTES # BLD: 0.9 K/UL (ref 0–1)
MONOCYTES NFR BLD: 4 % (ref 5–13)
NEUTS SEG # BLD: 18.6 K/UL (ref 1.8–8)
NEUTS SEG NFR BLD: 83 % (ref 32–75)
NRBC # BLD: 0 K/UL (ref 0–0.01)
NRBC BLD-RTO: 0 PER 100 WBC
PCO2 BLDA: 50 MMHG (ref 35–45)
PH BLDA: 7.38 [PH] (ref 7.35–7.45)
PLATELET # BLD AUTO: 460 K/UL (ref 150–400)
PMV BLD AUTO: 9.9 FL (ref 8.9–12.9)
PO2 BLDA: 61 MMHG (ref 75–100)
POTASSIUM SERPL-SCNC: 3.2 MMOL/L (ref 3.5–5.1)
RBC # BLD AUTO: 3.54 M/UL (ref 3.8–5.2)
SAO2 % BLD: 92 %
SAO2% DEVICE SAO2% SENSOR NAME: ABNORMAL
SODIUM SERPL-SCNC: 142 MMOL/L (ref 136–145)
WBC # BLD AUTO: 22.4 K/UL (ref 3.6–11)

## 2022-02-20 PROCEDURE — 80048 BASIC METABOLIC PNL TOTAL CA: CPT

## 2022-02-20 PROCEDURE — 74011000250 HC RX REV CODE- 250: Performed by: HOSPITALIST

## 2022-02-20 PROCEDURE — 77010033678 HC OXYGEN DAILY

## 2022-02-20 PROCEDURE — 36600 WITHDRAWAL OF ARTERIAL BLOOD: CPT

## 2022-02-20 PROCEDURE — 74011636637 HC RX REV CODE- 636/637: Performed by: INTERNAL MEDICINE

## 2022-02-20 PROCEDURE — 82803 BLOOD GASES ANY COMBINATION: CPT

## 2022-02-20 PROCEDURE — 74011250637 HC RX REV CODE- 250/637: Performed by: INTERNAL MEDICINE

## 2022-02-20 PROCEDURE — 74011250637 HC RX REV CODE- 250/637: Performed by: HOSPITALIST

## 2022-02-20 PROCEDURE — 94640 AIRWAY INHALATION TREATMENT: CPT

## 2022-02-20 PROCEDURE — 85025 COMPLETE CBC W/AUTO DIFF WBC: CPT

## 2022-02-20 PROCEDURE — 94760 N-INVAS EAR/PLS OXIMETRY 1: CPT

## 2022-02-20 PROCEDURE — 36415 COLL VENOUS BLD VENIPUNCTURE: CPT

## 2022-02-20 RX ORDER — IPRATROPIUM BROMIDE AND ALBUTEROL SULFATE 2.5; .5 MG/3ML; MG/3ML
3 SOLUTION RESPIRATORY (INHALATION)
Qty: 30 NEBULE | Refills: 0 | Status: SHIPPED | OUTPATIENT
Start: 2022-02-20

## 2022-02-20 RX ORDER — PREDNISONE 10 MG/1
TABLET ORAL
Qty: 18 TABLET | Refills: 0 | Status: SHIPPED | OUTPATIENT
Start: 2022-02-20 | End: 2022-02-26

## 2022-02-20 RX ORDER — AZITHROMYCIN 500 MG/1
500 TABLET, FILM COATED ORAL DAILY
Qty: 4 TABLET | Refills: 0 | Status: SHIPPED | OUTPATIENT
Start: 2022-02-21 | End: 2022-02-25

## 2022-02-20 RX ADMIN — FLUTICASONE PROPIONATE 2 SPRAY: 50 SPRAY, METERED NASAL at 09:02

## 2022-02-20 RX ADMIN — PREDNISONE 20 MG: 20 TABLET ORAL at 09:00

## 2022-02-20 RX ADMIN — POTASSIUM CHLORIDE 20 MEQ: 1500 TABLET, EXTENDED RELEASE ORAL at 09:00

## 2022-02-20 RX ADMIN — HYDROCHLOROTHIAZIDE 12.5 MG: 25 TABLET ORAL at 09:00

## 2022-02-20 RX ADMIN — FUROSEMIDE 40 MG: 40 TABLET ORAL at 09:00

## 2022-02-20 RX ADMIN — AZITHROMYCIN MONOHYDRATE 500 MG: 500 TABLET ORAL at 08:59

## 2022-02-20 RX ADMIN — ROPINIROLE HYDROCHLORIDE 0.5 MG: 0.25 TABLET, FILM COATED ORAL at 08:59

## 2022-02-20 RX ADMIN — DOCUSATE SODIUM 100 MG: 100 CAPSULE, LIQUID FILLED ORAL at 09:00

## 2022-02-20 RX ADMIN — LOSARTAN POTASSIUM 100 MG: 50 TABLET, FILM COATED ORAL at 09:01

## 2022-02-20 RX ADMIN — IPRATROPIUM BROMIDE AND ALBUTEROL SULFATE 3 ML: .5; 3 SOLUTION RESPIRATORY (INHALATION) at 12:00

## 2022-02-20 RX ADMIN — IPRATROPIUM BROMIDE AND ALBUTEROL SULFATE 3 ML: .5; 3 SOLUTION RESPIRATORY (INHALATION) at 07:36

## 2022-02-20 RX ADMIN — VENLAFAXINE HYDROCHLORIDE 150 MG: 75 CAPSULE, EXTENDED RELEASE ORAL at 08:59

## 2022-02-20 RX ADMIN — MONTELUKAST 10 MG: 10 TABLET, FILM COATED ORAL at 09:00

## 2022-02-20 RX ADMIN — ASPIRIN 81 MG 81 MG: 81 TABLET ORAL at 09:01

## 2022-02-20 NOTE — PROGRESS NOTES
Consult  Pulmonary, Critical Care    Name: Marlena House MRN: [id number]   : 1940 Hospital: [facility]   Date: 2022  Admission date: 2022 Hospital Day: 3       Subjective/Interval History:   Patient seen on the medical floor. Normally followed by Dr. Ramírez [facility] for underlying COPD. Not on oxygen. She normally takes nebulized albuterol Atrovent twice daily with budesonide. She will use nebulized albuterol Atrovent 4 times a day if she has problems. Over the last 2 weeks she has been having worsening problems initially was placed on doxycycline but it upset her stomach. She was then placed on Levaquin continued to have cough worsening shortness of breath some yellow sputum low-grade temperature up to 100. She called her pulmonologist who placed her on prednisone and something else but that was yesterday she had continued worsening came to the emergency room and was admitted    Hospital Problems  Date Reviewed: 2022          Codes Class Noted POA    Exacerbation of asthma ICD-10-CM: J45.901  ICD-9-CM: 493.92  2022 Yes        COPD with acute exacerbation ([facility] ICD-10-CM: J44.1  ICD-9-CM: 491.21  2022 Unknown              IMPRESSION:   1. Acute bronchitis  2. Acute exacerbation COPD  3. Chronic hypercapnic respiratory failure  4. Gastroesophageal reflux  5. Obstructive sleep apnea on home CPAP  Body mass index is 28.35 kg/m². 6.       RECOMMENDATIONS/PLAN:   1. Would add another antibiotic Zithromax be easier to take  2. We will continue her nebulized albuterol Atrovent 4 times daily  3. We will discontinue Symbicort and place on budesonide like she normally takes at home  4. Improved today we will discontinue Solu-Medrol and placed on prednisone  5. We will attempt to collect sputum for culture  6.    ABG pH 7.38, PCO2 50, pO2 61, bicarb 27.   7.   Continue using CPAP at night          [x] High complexity decision making was performed  [x] See my orders for details      Subjective/Initial History:     I was asked by Efra Lazo MD to see Sonya Abdul  a 81 y.o.    female in consultation for a chief complaint of acute exacerbation COPD with bronchitis        Allergies   Allergen Reactions    Latex Other (comments)     Flu like symptoms    Clindamycin Nausea Only    Darvocet A500 [Propoxyphene N-Acetaminophen] Unknown (comments)     Not sure patient states it was on her 'chart'    Penicillins Rash     Not really sure patient had bruises at the time    Reglan [Metoclopramide] Other (comments)     Made patient feel like a 'zombie'        MAR reviewed and pertinent medications noted or modified as needed     Current Facility-Administered Medications   Medication    losartan (COZAAR) tablet 100 mg    And    hydroCHLOROthiazide (HYDRODIURIL) tablet 12.5 mg    pantoprazole (PROTONIX) tablet 40 mg    famotidine (PEPCID) tablet 40 mg    predniSONE (DELTASONE) tablet 20 mg    azithromycin (ZITHROMAX) tablet 500 mg    dextromethorphan (DELSYM) 30 mg/5 mL syrup 30 mg    montelukast (SINGULAIR) tablet 10 mg    venlafaxine-SR (EFFEXOR-XR) capsule 150 mg    rOPINIRole (REQUIP) tablet 0.5 mg    traZODone (DESYREL) tablet 150 mg    fluticasone propionate (FLONASE) 50 mcg/actuation nasal spray 2 Spray    potassium chloride (K-DUR, KLOR-CON M20) SR tablet 20 mEq    aspirin chewable tablet 81 mg    rOPINIRole (REQUIP) tablet 1.5 mg    furosemide (LASIX) tablet 40 mg    albuterol (PROVENTIL HFA, VENTOLIN HFA, PROAIR HFA) inhaler 2 Puff    sodium chloride (NS) flush 5-40 mL    sodium chloride (NS) flush 5-40 mL    ondansetron (ZOFRAN ODT) tablet 4 mg    docusate sodium (COLACE) capsule 100 mg    albuterol-ipratropium (DUO-NEB) 2.5 MG-0.5 MG/3 ML      Patient PCP: Ino Graham MD  PMH:  has a past medical history of Arthritis, Asthma, Bronchiectasis ([address] Chronic pain, GERD (gastroesophageal reflux disease), Hypertension, MAC (mycobacterium avium-intracellulare complex), Other ill-defined conditions(799.89), Other ill-defined conditions(799.89), Other ill-defined conditions(799.89), Psychiatric disorder, and Unspecified sleep apnea. PSH:   has a past surgical history that includes hx hysterectomy (2002); hx tonsillectomy; hx appendectomy; pr breast surgery procedure unlisted; hx orthopaedic (Left, 2002); hx orthopaedic (Right); hx orthopaedic (Left); hx mohs procedure (Right); and hx cholecystectomy. FHX: family history includes Cancer in her sister; Heart Disease in her sister; Hypertension in her sister; Lung Disease in her father; OSTEOARTHRITIS in her sister and sister; Other in her brother; Stroke in her mother. SHX:  reports that she has never smoked. She has never used smokeless tobacco. She reports that she does not drink alcohol and does not use drugs. Systemic review:  General weight stable no chronic fever chills or sweats  Eyes no double vision or momentary blindness  ENT no sinus congestion drainage or facial pain  Musculoskeletal she states she always has muscle aches and pains but nothing out of the ordinary  Endocrinologic no polyuria polydipsia  Neurologic no seizures or syncope  Gastrointestinal chronic gastroesophageal reflux. She takes Protonix in the morning and Pepcid at bedtime and has fair control.   She is known to have a large hiatal hernia and continues to have occasional problems even with her medications  Genitourinary no pain or discomfort on urination no bleeding  Cardiovascular no history of heart disease heart attack chest pain diaphoresis or ankle edema  Respiratory as mentioned worsening shortness of breath over the last 2 weeks with cough some yellow sputum and some wheezing    Objective:     Vital Signs: Telemetry:    normal sinus rhythm Intake/Output:   Visit Vitals  /68   Pulse 78   Temp 98.2 °F (36.8 °C)   Resp 16   Ht 5' 2\" (1.575 m)   Wt 155 lb (70.3 kg) SpO2 91%   Breastfeeding No   BMI 28.35 kg/m²       Temp (24hrs), Av.9 °F (36.6 °C), Min:97.4 °F (36.3 °C), Max:98.2 °F (36.8 °C)        O2 Device: None (Room air) O2 Flow Rate (L/min): 1 l/min       Wt Readings from Last 4 Encounters:   22 155 lb (70.3 kg)   21 167 lb (75.8 kg)   16 182 lb 3.2 oz (82.6 kg)   02/23/15 176 lb (79.8 kg)          Intake/Output Summary (Last 24 hours) at 2022 1147  Last data filed at 2022 0341  Gross per 24 hour   Intake 360 ml   Output    Net 360 ml       Last shift:      No intake/output data recorded. Last 3 shifts:  1901 -  0700  In: 360 [P.O.:360]  Out: 300 [Urine:300]       Physical Exam:   General:  female; being up on the side of the bed eating breakfast in no distress currently on room air  HEENT: NCAT, normal oral mucosa  Eyes: anicteric; conjunctiva clear extraocular movements intact  Neck: no nodes, no JVD, no accessory MM use. Chest: no deformity,   Cardiac: Regular rate and rhythm no murmur  Lungs: Fair at best air movement with prolongation of exhalation very coarse exhalation just a few wheezes no definite rales  Abd: Soft nontender normal bowel sounds  Ext: no edema; no joint swelling;  No clubbing  : clear urine  Neuro: Awake alert oriented speech is clear moves all 4 extremities  Psych- no agitation, oriented to person;   Skin: warm, dry, no cyanosis;   Pulses: Brachial and radial pulses intact  Capillary: Normal capillary refill    Labs:    Recent Labs     22  0800 22  0901 22  1547   WBC 22.4* 18.0* 21.0*   HGB 10.6* 10.3* 10.9*   * 355 337     Recent Labs     22  0800 22  0901 22  1547    137 133*   K 3.2* 3.5 3.4*    103 99   CO2 27 27 28   * 192* 164*   BUN 35* 24* 25*   CREA 1.26* 1.01 1.19*   CA 9.5 9.6 9.9   PHOS  --  3.5  --    LAC  --   --  1.5   ALB  --  2.7* 2.7*   ALT  --   --  47     Recent Labs     22  0610 22  0558   PH 7. 38 7.40   PCO2 50* 48*   PO2 61* 100   HCO3 27* 28*   FIO2 21.0  --    2 L nasal oxygen  COVID-19 antigen negative  Room air oxygen saturation 91%  Lab Results   Component Value Date/Time    Culture result: No growth 1 day 02/18/2022 03:47 PM    Culture result: NO GROWTH 2 DAYS 04/04/2016 02:05 PM    Culture result: MIXED SKIN MIGUE ISOLATED 12/11/2013 11:13 AM     Lab Results   Component Value Date/Time    TSH 0.14 (L) 02/19/2022 09:01 AM       Imaging:    CXR Results  (Last 48 hours)               02/18/22 1553  XR CHEST PORT Final result    Impression:  The cardiomediastinal silhouette is appropriate for age, technique,   and lung expansion. Pulmonary vasculature is not congested. The lungs are   essentially clear. No effusion or pneumothorax is seen. Localized elevation right hemidiaphragm noted       Narrative:  1 view               Results from Hospital Encounter encounter on 02/18/22    XR CHEST PORT    Narrative  1 view    Impression  The cardiomediastinal silhouette is appropriate for age, technique,  and lung expansion. Pulmonary vasculature is not congested. The lungs are  essentially clear. No effusion or pneumothorax is seen. Localized elevation right hemidiaphragm noted      Results from Hospital Encounter encounter on 05/21/21    XR FOREARM RT AP/LAT    Narrative  Radiograph of the right forearm, 2 views    INDICATION: Fall, injury    COMPARISON: None    Impression  Findings/impression:  Dorsal soft tissue swelling of the proximal forearm. No definite acute fracture  or subluxation. Significant degenerative changes of the first carpometacarpal  joint. XR HIP RT W OR WO PELV 2-3 VWS    Narrative  Radiograph of the right hip, 2 views    INDICATION: Fall, injury    COMPARISON: None    FINDINGS:  No acute fracture or subluxation. No aggressive osseous lesions. Soft tissues  are radiographically normal. Presumed vascular phleboliths of the right pelvis.   Degenerative changes of the lower thoracic spine.    Impression  No acute fracture or subluxation. Results from [place] encounter on 02/18/22    CTA CHEST W OR W WO CONT    Narrative  Dose Reduction:  All CT scans at this facility are performed using dose reduction optimization  techniques as appropriate to a performed exam including the following: Automated  exposure control, adjustments of the mA and/or kV according to patient size, or  use of iterative reconstruction technique. IV contrast: 100 cc Isovue 370    FINDINGS: Contrast-enhanced images were obtained, with MIP reformats. Left  shoulder arthroplasty. Postsurgical changes of right shoulder with radiodense  humeral head anchors present. Beam hardening artifacts from left hip  arthroplasty and high attenuation contrast and right brachiocephalic vein. No  focal thyroid lesion. No pathologic axillary adenopathy. Subcentimeter  mediastinal nodes. No evidence of the central pulmonary emboli. Peripheral  vessels are suboptimally evaluated due to respiratory motion artifacts. No  evidence of thoracic aortic aneurysm or dissection. No pleural or pericardial  effusion. Hiatus hernia with partially intrathoracic stomach. Prominent left  atrium without left atrial appendage thrombus. Cholecystectomy. As a  continuation, nonspecific, possibly related to airways disease. Degenerative  changes of the spine are noted. Impression  Suboptimal due to respiratory motion artifacts. As visualized, no CT  findings of acute pulmonary embolus. Discussion: Patient with underlying COPD normally not on any oxygen has had an acute exacerbation probably an acute bronchitis. Did not improve with doxycycline or Levaquin. Her baseline medications include nebulized albuterol Atrovent and budesonide we will resume them discontinue Symbicort. She is feeling better we will switch Solu-Medrol to oral prednisone. Will attempt to collect sputum. Will place on Zithromax and follow WBC count.   Her blood gas shows PCO2 of 48 with a normal pH however her PO2 was 100. I have decreased oxygen to 1 L we will check overnight oximetry on CPAP with no oxygen supplementation and then repeat an ABG in a.m. Patient seen 2/20/22 in no acute distress on room air. She sates she is feeling much better. ABG pH 7.38, PCO2 50, pO2 61, bicarb 27. CTA chest negative.     Peggy Link

## 2022-02-20 NOTE — PROGRESS NOTES
Met with pt at bedside. IMM discussed and completed. Choice completed for [facility] [email or web address]  Both forms filed in Med Rec Dept. Spoke with on call, Adela Guerrero [phone] regarding O2 for DC this date. He will deliver portable and [facility] will schedule concentrator on Mon. Pt is aware. Ulus Lennox NP paged for O2 conserving device. Order received and entered  Documentation faxed via [employer] to [facility].  Noted Nebulizer order. Verified with the pt that she has a nebulizer for home use currently. 2nd DME not ordered this date. No other intervention required for this Home/Self Care DC. Please call [phone] if status changes and assist is needed.

## 2022-02-20 NOTE — PROGRESS NOTES
Home Oxygen has been delivered to patients bedside. Spouse and Patient have been educated by [facility]  on operation of home oxygen. Patient will be transported home via spouse. Discharge plan of care/case management plan validated with provider discharge order.

## 2022-02-20 NOTE — DISCHARGE SUMMARY
Hospitalist Discharge Summary     Patient ID:    Easton Betts  [id number]  89 y.o.  1940    Admit date: 2/18/2022    Discharge date : 2/20/2022    Chronic Diagnoses:    Problem List as of 2/20/2022 Date Reviewed: 2/18/2022          Codes Class Noted - Resolved    Exacerbation of asthma ICD-10-CM: J45.901  ICD-9-CM: 493.92  2/18/2022 - Present        COPD with acute exacerbation ([facility] ICD-10-CM: J44.1  ICD-9-CM: 491.21  2/18/2022 - Present        Subdural bleeding ([facility] [address] ICD-10-CM: I62.00  ICD-9-CM: 432.1  5/21/2021 - Present        Fall ICD-10-CM: W19. Shawna Roof  ICD-9-CM: V270.1  5/21/2021 - Present          22    Final Diagnoses: Active Problems:    Exacerbation of asthma (2/18/2022)      COPD with acute exacerbation ([facility] [address] (2/18/2022)        Reason for Hospitalization/Hospital Course: The patient is an 27-year-old female with a PMH significant for COPD, NELLY, asthma and vasculitis. She has a history of MAC infections as well. She presents to the emergency room with acute shortness of breath and hypoxia. She was seen by her PCP prior to emergency room visit and given Levaquin and prednisone without improvement. Her shortness of breath had gradually worsened with associated symptoms of productive sputum, wheezing and tachypnea. Admitted for further management of acute COPD/asthma exacerbation with bronchitis. She was started on oral steroids, oral azithromycin and duo nebs. Pulmonary consulted. Breathing has improved, wheezing has improved. Assessment of pulse oximetry oxygenation while resting is stable, while ambulating her saturations decreased to 85%, when placed on 2 L while ambulating she is 94%. She is stable for discharge home. We will have case management arrange oxygen, home nebulizer for discharge. We will send on oral azithromycin and a prednisone taper. Advised follow-up outpatient with her PCP for further management.     Discharge Medications:   Current Discharge Medication List      START taking these medications    Details   azithromycin (ZITHROMAX) 500 mg tab Take 1 Tablet by mouth daily for 4 doses. Qty: 4 Tablet, Refills: 0  Start date: 2/21/2022, End date: 2/25/2022      !! predniSONE (DELTASONE) 10 mg tablet Take 20 mg by mouth two (2) times daily (with meals) for 2 days, THEN 30 mg daily for 2 days, THEN 20 mg daily for 2 days. Qty: 18 Tablet, Refills: 0  Start date: 2/20/2022, End date: 2/26/2022      albuterol-ipratropium (DUO-NEB) 2.5 mg-0.5 mg/3 ml nebu 3 mL by Nebulization route every six (6) hours as needed for Wheezing or Shortness of Breath (Cute shortness of breath not relieved by inhaler). Qty: 30 Nebule, Refills: 0  Start date: 2/20/2022       !! - Potential duplicate medications found. Please discuss with provider. CONTINUE these medications which have NOT CHANGED    Details   allopurinoL (ZYLOPRIM) 300 mg tablet Take 150 mg by mouth daily. colchicine 0.6 mg tablet Take 0.6 mg by mouth two (2) times a day. azelastine (OPTIVAR) 0.05 % ophthalmic solution Administer 1 Drop to both eyes two (2) times a day. metOLazone (ZAROXOLYN) 5 mg tablet Take 5 mg by mouth daily. losartan-hydroCHLOROthiazide (HYZAAR) 100-12.5 mg per tablet Take 1 Tablet by mouth daily. pantoprazole (Protonix) 40 mg tablet Take 40 mg by mouth daily. Indications: gastroesophageal reflux disease      furosemide (LASIX) 40 mg tablet Take 40 mg by mouth daily. famotidine (PEPCID) 20 mg tablet Take 20 mg by mouth nightly. albuterol (ProAir HFA) 90 mcg/actuation inhaler Take 2 Puffs by inhalation every four (4) hours as needed for Wheezing or Shortness of Breath. calcium citrate/vitamin D3 (CITRACAL + D PO) Take 1 Tablet by mouth two (2) times a day. MAGNESIUM GLYCINATE PO Take 665 mg by mouth every evening. iron 18 mg tab Take 1 Tablet by mouth daily. biotin 5,000 mcg subl 1 Tablet by SubLINGual route every evening.       !! predniSONE (DELTASONE) 10 mg tablet Take  by mouth daily (with breakfast). arformoterol (BROVANA) 15 mcg/2 mL nebu neb solution 15 mcg by Nebulization route two (2) times a day. budesonide (PULMICORT) 0.5 mg/2 mL nebulizer suspension 500 mcg by Nebulization route two (2) times a day. ipratropium (ATROVENT) 0.02 % nebulizer solution by Nebulization route two (2) times a day. Can use every 4 hrs as needed but does use it twice daily. potassium chloride (KLOR-CON M20) 20 mEq tablet Take 20 mEq by mouth two (2) times a day. montelukast (SINGULAIR) 10 mg tablet Take 10 mg by mouth daily. gabapentin (NEURONTIN) 100 mg capsule Take 100 mg by mouth daily. fluticasone (FLONASE) 50 mcg/actuation nasal spray 2 Sprays by Both Nostrils route two (2) times a day. aspirin 81 mg chewable tablet Take 81 mg by mouth two (2) times a day. !! rOPINIRole (REQUIP) 0.5 mg tablet Take 1.5 mg by mouth nightly. alclometasone (ACLOVATE) 0.05 % ointment Apply  to affected area two (2) times a day. apply thin layer as directed      ketoconazole (NIZORAL) 2 % shampoo Apply  to affected area daily as needed for Itching. VENLAFAXINE HCL (EFFEXOR XR PO) Take 150 mg by mouth daily. !! rOPINIRole (REQUIP) 0.5 mg tablet Take 0.5 mg by mouth two (2) times a day. 1 tab in am, one tab in the middle of the day, and 3 tabs at night. traZODone (DESYREL) 100 mg tablet Take 150 mg by mouth nightly. !! - Potential duplicate medications found. Please discuss with provider. STOP taking these medications       levoFLOXacin (LEVAQUIN) 500 mg tablet Comments:   Reason for Stopping:         lansoprazole (PREVACID) 30 mg capsule Comments:   Reason for Stopping:         tiotropium (SPIRIVA WITH HANDIHALER) 18 mcg inhalation capsule Comments:   Reason for Stopping:         losartan-hydrochlorothiazide (HYZAAR) 100-25 mg per tablet Comments:   Reason for Stopping:                  Follow up Care: 1. Lender Councilman, MD in 1-2 weeks. Follow-up Information     Follow up With Specialties Details Why Contact Info    Lender Councilman, MD Family Medicine   Regan Gasca [address] [place] [address] [address] [place] [address]  [phone]              Patient Follow Up Instructions: Activity: Activity as tolerated  Diet:  Cardiac Diet  Wound Care: None needed     Condition at Discharge:  Stable  __________________________________________________________________    Disposition  Home or Self Care  ____________________________________________________________________    Code Status:  Full Code  ___________________________________________________________________    Discharge Exam:  Patient seen and examined by me on discharge day. Pertinent Findings:  Gen:    Not in distress  Chest: Clear lungs  CVS:   Regular rhythm.   No edema  Abd:  Soft, not distended, not tender  Neuro:  Alert        CONSULTATIONS: Pulmonary/Intensive care    Significant Diagnostic Studies:   Recent Results (from the past 24 hour(s))   BLOOD GAS, ARTERIAL    Collection Time: 02/20/22  6:10 AM   Result Value Ref Range    pH 7.38 7.35 - 7.45      PCO2 50 (H) 35 - 45 mmHg    PO2 61 (L) 75 - 100 mmHg    O2 SAT 92 (L) >95 %    BICARBONATE 27 (H) 22 - 26 mmol/L    BASE EXCESS 3.4 (H) 0 - 2 mmol/L    O2 METHOD Room air      FIO2 21.0 %    SITE Right Radial      YAMIL'S TEST PASS     METABOLIC PANEL, BASIC    Collection Time: 02/20/22  8:00 AM   Result Value Ref Range    Sodium 142 136 - 145 mmol/L    Potassium 3.2 (L) 3.5 - 5.1 mmol/L    Chloride 106 97 - 108 mmol/L    CO2 27 21 - 32 mmol/L    Anion gap 9 5 - 15 mmol/L    Glucose 116 (H) 65 - 100 mg/dL    BUN 35 (H) 6 - 20 mg/dL    Creatinine 1.26 (H) 0.55 - 1.02 mg/dL    BUN/Creatinine ratio 28 (H) 12 - 20      GFR est AA 49 (L) >60 ml/min/1.73m2    GFR est non-AA 41 (L) >60 ml/min/1.73m2    Calcium 9.5 8.5 - 10.1 mg/dL   CBC WITH AUTOMATED DIFF    Collection Time: 02/20/22  8:00 AM   Result Value Ref Range    WBC 22.4 (H) 3.6 - 11.0 K/uL    RBC 3.54 (L) 3.80 - 5.20 M/uL    HGB 10.6 (L) 11.5 - 16.0 g/dL    HCT 32.7 (L) 35.0 - 47.0 %    MCV 92.4 80.0 - 99.0 FL    MCH 29.9 26.0 - 34.0 PG    MCHC 32.4 30.0 - 36.5 g/dL    RDW 13.7 11.5 - 14.5 %    PLATELET 333 (H) 946 - 400 K/uL    MPV 9.9 8.9 - 12.9 FL    NRBC 0.0 0.0  WBC    ABSOLUTE NRBC 0.00 0.00 - 0.01 K/uL    NEUTROPHILS 83 (H) 32 - 75 %    LYMPHOCYTES 10 (L) 12 - 49 %    MONOCYTES 4 (L) 5 - 13 %    EOSINOPHILS 0 0 - 7 %    BASOPHILS 0 0 - 1 %    IMMATURE GRANULOCYTES 3 (H) 0 - 0.5 %    ABS. NEUTROPHILS 18.6 (H) 1.8 - 8.0 K/UL    ABS. LYMPHOCYTES 2.1 0.8 - 3.5 K/UL    ABS. MONOCYTES 0.9 0.0 - 1.0 K/UL    ABS. EOSINOPHILS 0.0 0.0 - 0.4 K/UL    ABS. BASOPHILS 0.1 0.0 - 0.1 K/UL    ABS. IMM. GRANS. 0.6 (H) 0.00 - 0.04 K/UL    DF AUTOMATED       CTA CHEST W OR W WO CONT   Final Result   Suboptimal due to respiratory motion artifacts. As visualized, no CT   findings of acute pulmonary embolus. XR CHEST PORT   Final Result   The cardiomediastinal silhouette is appropriate for age, technique,   and lung expansion. Pulmonary vasculature is not congested. The lungs are   essentially clear. No effusion or pneumothorax is seen.    Localized elevation right hemidiaphragm noted          Time spent in direct and indirect care including coordination of services: Greater than 35 minutes    Signed:  Rufina Brewer NP  2/20/2022  11:19 AM

## 2022-02-20 NOTE — PROGRESS NOTES
Received call from Floor RN regarding possible need for home O2. CM will continue to follow for DC orders.

## 2022-02-20 NOTE — PROGRESS NOTES
Attending in to round on patient. Patient qualifies for home oxygen at 2LPM, order obtained and placed in EMR. Verbal order obtained for discharge. Order placed in EMR. Case Management team notified of discharge today on home oxygen. CM team will order oxygen. Patient with no additional needs at home. Ambulating appropriately with no therapy needs.

## 2022-02-20 NOTE — PROGRESS NOTES
Patient with O2 Sats at 90% on room air while resting this morning. Patient then ambulated in hallway on room air and saturations decreased to 85% on RA, O2 Sats increased to 94% on 2LNC.

## 2022-02-20 NOTE — PROGRESS NOTES
Walked patient on RA resulting in saturations in the 90's  The highest was 94 while walking and the lowest was 90 at the completion. Left patient on RA.

## 2022-02-25 LAB
BACTERIA SPEC CULT: NORMAL
SPECIAL REQUESTS,SREQ: NORMAL

## 2022-03-18 PROBLEM — J44.1 COPD WITH ACUTE EXACERBATION (HCC): Status: ACTIVE | Noted: 2022-02-18
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
## 2022-03-18 PROBLEM — J45.901 EXACERBATION OF ASTHMA: Status: ACTIVE | Noted: 2022-02-18

## 2022-03-19 PROBLEM — I62.00 SUBDURAL BLEEDING (HCC): Status: ACTIVE | Noted: 2021-05-21

## 2022-05-17 ENCOUNTER — APPOINTMENT (OUTPATIENT)
Dept: GENERAL RADIOLOGY | Age: 82
End: 2022-05-17
Attending: EMERGENCY MEDICINE
Payer: MEDICARE

## 2022-05-17 ENCOUNTER — HOSPITAL ENCOUNTER (EMERGENCY)
Age: 82
Discharge: HOME | End: 2022-05-17
Attending: EMERGENCY MEDICINE
Payer: MEDICARE

## 2022-05-17 VITALS
HEIGHT: 61 IN | WEIGHT: 153 LBS | RESPIRATION RATE: 18 BRPM | OXYGEN SATURATION: 97 % | TEMPERATURE: 98.4 F | SYSTOLIC BLOOD PRESSURE: 135 MMHG | DIASTOLIC BLOOD PRESSURE: 62 MMHG | BODY MASS INDEX: 28.89 KG/M2 | HEART RATE: 86 BPM

## 2022-05-17 DIAGNOSIS — M10.9 ACUTE GOUT OF RIGHT FOOT, UNSPECIFIED CAUSE: Primary | ICD-10-CM

## 2022-05-17 PROCEDURE — A9270 NON-COVERED ITEM OR SERVICE: HCPCS | Performed by: EMERGENCY MEDICINE

## 2022-05-17 PROCEDURE — 73630 X-RAY EXAM OF FOOT: CPT

## 2022-05-17 PROCEDURE — 74011636637 HC RX REV CODE- 636/637: Performed by: EMERGENCY MEDICINE

## 2022-05-17 PROCEDURE — 99283 EMERGENCY DEPT VISIT LOW MDM: CPT

## 2022-05-17 PROCEDURE — 74011250637 HC RX REV CODE- 250/637: Performed by: EMERGENCY MEDICINE

## 2022-05-17 RX ORDER — PREDNISONE 20 MG/1
40 TABLET ORAL DAILY
Qty: 8 TABLET | Refills: 0 | Status: SHIPPED | OUTPATIENT
Start: 2022-05-18 | End: 2022-05-22

## 2022-05-17 RX ORDER — ACETAMINOPHEN 500 MG
1000 TABLET ORAL ONCE
Status: COMPLETED | OUTPATIENT
Start: 2022-05-17 | End: 2022-05-17

## 2022-05-17 RX ORDER — PREDNISONE 20 MG/1
60 TABLET ORAL
Status: COMPLETED | OUTPATIENT
Start: 2022-05-17 | End: 2022-05-17

## 2022-05-17 RX ADMIN — ACETAMINOPHEN 1000 MG: 500 TABLET ORAL at 11:47

## 2022-05-17 RX ADMIN — PREDNISONE 60 MG: 20 TABLET ORAL at 12:07

## 2022-05-17 NOTE — ED TRIAGE NOTES
Pt c/o R foot pain. States she stepped on her foot wrong yesterday. Unable to get into foot doctor today.

## 2022-05-17 NOTE — ED PROVIDER NOTES
EMERGENCY DEPARTMENT HISTORY AND PHYSICAL EXAM      Date: 5/17/2022  Patient Name: Chelita Griffin      History of Presenting Illness     Chief Complaint   Patient presents with    Foot Pain       History Provided By: Patient    HPI: Chelita Griffin, 81 y.o. female with a past medical history significant for Gout, Bronchiectasis, Anxiety presents to the ED with cc of foot pain. Pt noticed R foot lateral pain yesterday afternoon. Denies trauma to foot, twisting it. Denies F/C, malaise, fatigue, feeling ill. States she has been taking her allopurinol as prescribed. There are no other complaints, changes, or physical findings at this time. PCP: Lieutenant Alisa MD    Current Facility-Administered Medications   Medication Dose Route Frequency Provider Last Rate Last Admin    predniSONE (DELTASONE) tablet 60 mg  60 mg Oral NOW Dhaval Fletcher MD         Current Outpatient Medications   Medication Sig Dispense Refill    [START ON 5/18/2022] predniSONE (DELTASONE) 20 mg tablet Take 2 Tablets by mouth daily for 4 days. With Breakfast 8 Tablet 0    albuterol-ipratropium (DUO-NEB) 2.5 mg-0.5 mg/3 ml nebu 3 mL by Nebulization route every six (6) hours as needed for Wheezing or Shortness of Breath (Cute shortness of breath not relieved by inhaler). 30 Nebule 0    allopurinoL (ZYLOPRIM) 300 mg tablet Take 150 mg by mouth daily.  colchicine 0.6 mg tablet Take 0.6 mg by mouth two (2) times a day.  aspirin 81 mg chewable tablet Take 81 mg by mouth two (2) times a day.  azelastine (OPTIVAR) 0.05 % ophthalmic solution Administer 1 Drop to both eyes two (2) times a day.  metOLazone (ZAROXOLYN) 5 mg tablet Take 5 mg by mouth daily.  losartan-hydroCHLOROthiazide (HYZAAR) 100-12.5 mg per tablet Take 1 Tablet by mouth daily.  rOPINIRole (REQUIP) 0.5 mg tablet Take 1.5 mg by mouth nightly.  pantoprazole (Protonix) 40 mg tablet Take 40 mg by mouth daily.  Indications: gastroesophageal reflux disease  furosemide (LASIX) 40 mg tablet Take 40 mg by mouth daily.  famotidine (PEPCID) 20 mg tablet Take 20 mg by mouth nightly.  albuterol (ProAir HFA) 90 mcg/actuation inhaler Take 2 Puffs by inhalation every four (4) hours as needed for Wheezing or Shortness of Breath.  calcium citrate/vitamin D3 (CITRACAL + D PO) Take 1 Tablet by mouth two (2) times a day.  MAGNESIUM GLYCINATE PO Take 665 mg by mouth every evening.  iron 18 mg tab Take 1 Tablet by mouth daily.  biotin 5,000 mcg subl 1 Tablet by SubLINGual route every evening.  alclometasone (ACLOVATE) 0.05 % ointment Apply  to affected area two (2) times a day. apply thin layer as directed      ketoconazole (NIZORAL) 2 % shampoo Apply  to affected area daily as needed for Itching.  predniSONE (DELTASONE) 10 mg tablet Take  by mouth daily (with breakfast).  arformoterol (BROVANA) 15 mcg/2 mL nebu neb solution 15 mcg by Nebulization route two (2) times a day.  budesonide (PULMICORT) 0.5 mg/2 mL nebulizer suspension 500 mcg by Nebulization route two (2) times a day.  ipratropium (ATROVENT) 0.02 % nebulizer solution by Nebulization route two (2) times a day. Can use every 4 hrs as needed but does use it twice daily.  potassium chloride (KLOR-CON M20) 20 mEq tablet Take 20 mEq by mouth two (2) times a day.  montelukast (SINGULAIR) 10 mg tablet Take 10 mg by mouth daily.  VENLAFAXINE HCL (EFFEXOR XR PO) Take 150 mg by mouth daily.  rOPINIRole (REQUIP) 0.5 mg tablet Take 0.5 mg by mouth two (2) times a day. 1 tab in am, one tab in the middle of the day, and 3 tabs at night.  traZODone (DESYREL) 100 mg tablet Take 150 mg by mouth nightly.  gabapentin (NEURONTIN) 100 mg capsule Take 100 mg by mouth daily.  fluticasone (FLONASE) 50 mcg/actuation nasal spray 2 Sprays by Both Nostrils route two (2) times a day.          Past History     Past Medical History:  Past Medical History: Diagnosis Date    Arthritis     hands    Asthma     Bronchiectasis (HCC)     Chronic pain     back (pt denies)    GERD (gastroesophageal reflux disease)     Hypertension     MAC (mycobacterium avium-intracellulare complex)     Other ill-defined conditions(799.89)     MAC- mycobacterium avium complex    Other ill-defined conditions(799.89)     restless leg syndrome    Other ill-defined conditions(799.89)     bronchiectasis    Psychiatric disorder     anxiety    Unspecified sleep apnea     wears CPAP at night       Past Surgical History:  Past Surgical History:   Procedure Laterality Date    HX APPENDECTOMY      HX CHOLECYSTECTOMY      HX HYSTERECTOMY  2002    HX MOHS PROCEDURES Right     HX ORTHOPAEDIC Left 2002    hemiarthrectomy    HX ORTHOPAEDIC Right     bunionectomy, hammer toe repair    HX ORTHOPAEDIC Left     3 shoulder surgeries - last was a reverse shoulder replacement    HX TONSILLECTOMY      VT BREAST SURGERY PROCEDURE UNLISTED      breast reductions       Family History:  Family History   Problem Relation Age of Onset    Stroke Mother     Lung Disease Father     Cancer Sister         bladder    OSTEOARTHRITIS Sister     OSTEOARTHRITIS Sister     Hypertension Sister     Heart Disease Sister     Other Brother         dies with complications with cholecystectomy       Social History:  Social History     Tobacco Use    Smoking status: Never Smoker    Smokeless tobacco: Never Used   Substance Use Topics    Alcohol use: No    Drug use: No       Allergies:   Allergies   Allergen Reactions    Latex Other (comments)     Flu like symptoms    Clindamycin Nausea Only    Darvocet A500 [Propoxyphene N-Acetaminophen] Unknown (comments)     Not sure patient states it was on her 'chart'    Penicillins Rash     Not really sure patient had bruises at the time    Reglan [Metoclopramide] Other (comments)     Made patient feel like a 'zombie'         Review of Systems   Constitutional: Negative except as in HPI. Eyes: Negative except as in HPI.  ENT: Negative except as in HPI. Cardiovascular: Negative except as in HPI. Respiratory: Negative except as in HPI. Gastrointestinal: Negative except as in HPI. Genitourinary: Negative except as in HPI. Musculoskeletal: Negative except as in HPI. Integumentary: Negative except as in HPI. Neurological: Negative except as in HPI. Psychiatric: Negative except as in HPI. Endocrine: Negative except as in HPI. Hematologic/Lymphatic: Negative except as in HPI. Allergic/Immunologic: Negative except as in HPI. Physical Exam   Constitutional: Awake and alert, interactive, NAD  Eyes: PERRL, no injection or scleral icterus, no discharge  HEENT: NCAT, neck supple, MMM, no oropharyngeal exudates  CV: RRR, 2+ DPs  Respiratory: Unlabored on room air  MSK: FROM, R base of 5th metatarsal blanching erythema, tenderness  Skin: No rashes  Neuro: CN2-12 intact, symmetric facies, fluent speech. Psych: Well-groomed, normal speech, behavior, appropriate mood    Lab and Diagnostic Study Results     Labs -   No results found for this or any previous visit (from the past 12 hour(s)). Radiologic Studies -   [unfilled]  CT Results  (Last 48 hours)    None        CXR Results  (Last 48 hours)    None          Medical Decision Making and ED Course   - I am the first and primary provider for this patient AND AM THE PRIMARY PROVIDER OF RECORD. - I reviewed the vital signs, available nursing notes, past medical history, past surgical history, family history and social history. - Initial assessment performed. The patients presenting problems have been discussed, and the staff are in agreement with the care plan formulated and outlined with them. I have encouraged them to ask questions as they arise throughout their visit. Vital Signs-Reviewed the patient's vital signs.     Patient Vitals for the past 12 hrs:   Temp Pulse Resp BP SpO2   05/17/22 1136 98.4 °F (36.9 °C) 86 18 135/62 97 %       EKG interpretation:         Provider Notes (Medical Decision Making):   82F w/foot pain, erythema. Unlikely cellulitis or septic joint without systemic sx, likely gout. No direct trauma however given base of 5th metatarsal will get XR to r/o Humberto fx. ED Course:       ED Course as of 05/17/22 1310   Tue May 17, 2022   1309 XR FOOT RT MIN 3 V    Three-view right foot     Intraoperative pinning of the first and second metatarsals and first proximal  phalanx, with regional bone hypertrophy. Moderate DJD at the first MTP joint and  mild DJD throughout the midfoot. No acute fracture or bone destruction. Hammertoes. Small heel spur is noted [YA]      ED Course User Index  [YA] Venu Kraft MD           Disposition     Disposition: DC- Adult Discharges: All of the diagnostic tests were reviewed and questions answered. Diagnosis, care plan and treatment options were discussed. The patient understands the instructions and will follow up as directed. The patients results have been reviewed with them. They have been counseled regarding their diagnosis. The patient verbally convey understanding and agreement of the signs, symptoms, diagnosis, treatment and prognosis and additionally agrees to follow up as recommended with their PCP in 24 - 48 hours. They also agree with the care-plan and convey that all of their questions have been answered. I have also put together some discharge instructions for them that include: 1) educational information regarding their diagnosis, 2) how to care for their diagnosis at home, as well a 3) list of reasons why they would want to return to the ED prior to their follow-up appointment, should their condition change. Discharged      Diagnosis     Clinical Impression:   1. Acute gout of right foot, unspecified cause        Attestations:     Stephanie Hendrix MD

## 2022-05-17 NOTE — DISCHARGE INSTRUCTIONS
You were seen in the ER for your foot pain. This is likely from your gout. We are giving you a steroid to reduce your inflammation and pain for the next few days. You can also take tylenol and ibuprofen on top of this. You should follow up with your primary care doctor or foot doctor in the next week to make sure your symptoms are getting better. Return to the ER if the pain is worsening or spreading or if you begin having fevers, nausea, or any other new or concerning symptoms. Thank you! Thank you for allowing me to care for you in the emergency department. I sincerely hope that you are satisfied with your visit today. It is my goal to provide you with excellent care. Below you will find a list of your labs and imaging from your visit today. Should you have any questions regarding these results please do not hesitate to call the emergency department. Labs -   No results found for this or any previous visit (from the past 12 hour(s)). Radiologic Studies -   XR FOOT RT MIN 3 V   Final Result        CT Results  (Last 48 hours)      None          CXR Results  (Last 48 hours)      None               If you feel that you have not received excellent quality care or timely care, please ask to speak to the nurse manager. Please choose us in the future for your continued health care needs. ------------------------------------------------------------------------------------------------------------  The exam and treatment you received in the Emergency Department were for an urgent problem and are not intended as complete care. It is important that you follow-up with a doctor, nurse practitioner, or physician assistant to:  (1) confirm your diagnosis,  (2) re-evaluation of changes in your illness and treatment, and  (3) for ongoing care. If your symptoms become worse or you do not improve as expected and you are unable to reach your usual health care provider, you should return to the Emergency Department. We are available 24 hours a day. Please take your discharge instructions with you when you go to your follow-up appointment. If you have any problem arranging a follow-up appointment, contact the Emergency Department immediately. If a prescription has been provided, please have it filled as soon as possible to prevent a delay in treatment. Read the entire medication instruction sheet provided to you by the pharmacy. If you have any questions or reservations about taking the medication due to side effects or interactions with other medications, please call your primary care physician or contact the ER to speak with the charge nurse. Make an appointment with your family doctor or the physician you were referred to for follow-up of this visit as instructed on your discharge paperwork, as this is a mandatory follow-up. Return to the ER if you are unable to be seen or if you are unable to be seen in a timely manner. If you have any problem arranging the follow-up visit, contact the Emergency Department immediately.

## 2023-02-18 ENCOUNTER — APPOINTMENT (OUTPATIENT)
Dept: CT IMAGING | Age: 83
End: 2023-02-18
Attending: STUDENT IN AN ORGANIZED HEALTH CARE EDUCATION/TRAINING PROGRAM
Payer: MEDICARE

## 2023-02-18 ENCOUNTER — APPOINTMENT (OUTPATIENT)
Dept: GENERAL RADIOLOGY | Age: 83
End: 2023-02-18
Attending: STUDENT IN AN ORGANIZED HEALTH CARE EDUCATION/TRAINING PROGRAM
Payer: MEDICARE

## 2023-02-18 ENCOUNTER — HOSPITAL ENCOUNTER (EMERGENCY)
Age: 83
Discharge: HOME | End: 2023-02-18
Attending: STUDENT IN AN ORGANIZED HEALTH CARE EDUCATION/TRAINING PROGRAM
Payer: MEDICARE

## 2023-02-18 VITALS
BODY MASS INDEX: 27.6 KG/M2 | WEIGHT: 150 LBS | SYSTOLIC BLOOD PRESSURE: 106 MMHG | RESPIRATION RATE: 18 BRPM | OXYGEN SATURATION: 99 % | TEMPERATURE: 98.6 F | HEIGHT: 62 IN | DIASTOLIC BLOOD PRESSURE: 65 MMHG | HEART RATE: 88 BPM

## 2023-02-18 DIAGNOSIS — K52.9 GASTROENTERITIS: Primary | ICD-10-CM

## 2023-02-18 LAB
ALBUMIN SERPL-MCNC: 3.6 G/DL (ref 3.5–5)
ALBUMIN/GLOB SERPL: 1.2 (ref 1.1–2.2)
ALP SERPL-CCNC: 58 U/L (ref 45–117)
ALT SERPL-CCNC: 23 U/L (ref 12–78)
ANION GAP SERPL CALC-SCNC: 9 MMOL/L (ref 5–15)
AST SERPL W P-5'-P-CCNC: 36 U/L (ref 15–37)
ATRIAL RATE: 96 BPM
BASOPHILS # BLD: 0 K/UL (ref 0–0.1)
BASOPHILS NFR BLD: 0 % (ref 0–1)
BILIRUB SERPL-MCNC: 0.6 MG/DL (ref 0.2–1)
BUN SERPL-MCNC: 41 MG/DL (ref 6–20)
BUN/CREAT SERPL: 33 (ref 12–20)
CA-I BLD-MCNC: 9 MG/DL (ref 8.5–10.1)
CALCULATED P AXIS, ECG09: 102 DEGREES
CALCULATED R AXIS, ECG10: -16 DEGREES
CALCULATED T AXIS, ECG11: 38 DEGREES
CHLORIDE SERPL-SCNC: 103 MMOL/L (ref 97–108)
CO2 SERPL-SCNC: 24 MMOL/L (ref 21–32)
CREAT SERPL-MCNC: 1.26 MG/DL (ref 0.55–1.02)
DIAGNOSIS, 93000: NORMAL
DIFFERENTIAL METHOD BLD: ABNORMAL
EOSINOPHIL # BLD: 0 K/UL (ref 0–0.4)
EOSINOPHIL NFR BLD: 0 % (ref 0–7)
ERYTHROCYTE [DISTWIDTH] IN BLOOD BY AUTOMATED COUNT: 15.2 % (ref 11.5–14.5)
GLOBULIN SER CALC-MCNC: 2.9 G/DL (ref 2–4)
GLUCOSE SERPL-MCNC: 132 MG/DL (ref 65–100)
HCT VFR BLD AUTO: 36.9 % (ref 35–47)
HGB BLD-MCNC: 11.9 G/DL (ref 11.5–16)
IMM GRANULOCYTES # BLD AUTO: 0 K/UL (ref 0–0.04)
IMM GRANULOCYTES NFR BLD AUTO: 0 % (ref 0–0.5)
LIPASE SERPL-CCNC: 59 U/L (ref 73–393)
LYMPHOCYTES # BLD: 0.3 K/UL (ref 0.8–3.5)
LYMPHOCYTES NFR BLD: 3 % (ref 12–49)
MCH RBC QN AUTO: 30.1 PG (ref 26–34)
MCHC RBC AUTO-ENTMCNC: 32.2 G/DL (ref 30–36.5)
MCV RBC AUTO: 93.2 FL (ref 80–99)
MONOCYTES # BLD: 0.5 K/UL (ref 0–1)
MONOCYTES NFR BLD: 6 % (ref 5–13)
NEUTS SEG # BLD: 7.8 K/UL (ref 1.8–8)
NEUTS SEG NFR BLD: 91 % (ref 32–75)
NRBC # BLD: 0 K/UL (ref 0–0.01)
NRBC BLD-RTO: 0 PER 100 WBC
P-R INTERVAL, ECG05: 184 MS
PLATELET # BLD AUTO: 195 K/UL (ref 150–400)
PMV BLD AUTO: 10.6 FL (ref 8.9–12.9)
POTASSIUM SERPL-SCNC: 3.4 MMOL/L (ref 3.5–5.1)
PROT SERPL-MCNC: 6.5 G/DL (ref 6.4–8.2)
Q-T INTERVAL, ECG07: 340 MS
QRS DURATION, ECG06: 76 MS
QTC CALCULATION (BEZET), ECG08: 429 MS
RBC # BLD AUTO: 3.96 M/UL (ref 3.8–5.2)
SODIUM SERPL-SCNC: 136 MMOL/L (ref 136–145)
TROPONIN I SERPL HS-MCNC: 32 NG/L (ref 0–51)
TROPONIN I SERPL HS-MCNC: 39 NG/L (ref 0–51)
VENTRICULAR RATE, ECG03: 96 BPM
WBC # BLD AUTO: 8.6 K/UL (ref 3.6–11)

## 2023-02-18 PROCEDURE — 80053 COMPREHEN METABOLIC PANEL: CPT

## 2023-02-18 PROCEDURE — 74011250636 HC RX REV CODE- 250/636: Performed by: STUDENT IN AN ORGANIZED HEALTH CARE EDUCATION/TRAINING PROGRAM

## 2023-02-18 PROCEDURE — 83690 ASSAY OF LIPASE: CPT

## 2023-02-18 PROCEDURE — 84484 ASSAY OF TROPONIN QUANT: CPT

## 2023-02-18 PROCEDURE — 93005 ELECTROCARDIOGRAM TRACING: CPT

## 2023-02-18 PROCEDURE — 85025 COMPLETE CBC W/AUTO DIFF WBC: CPT

## 2023-02-18 PROCEDURE — 36415 COLL VENOUS BLD VENIPUNCTURE: CPT

## 2023-02-18 PROCEDURE — 74177 CT ABD & PELVIS W/CONTRAST: CPT

## 2023-02-18 PROCEDURE — 74011000250 HC RX REV CODE- 250: Performed by: STUDENT IN AN ORGANIZED HEALTH CARE EDUCATION/TRAINING PROGRAM

## 2023-02-18 PROCEDURE — 96374 THER/PROPH/DIAG INJ IV PUSH: CPT

## 2023-02-18 PROCEDURE — 71045 X-RAY EXAM CHEST 1 VIEW: CPT

## 2023-02-18 PROCEDURE — 74011000636 HC RX REV CODE- 636: Performed by: STUDENT IN AN ORGANIZED HEALTH CARE EDUCATION/TRAINING PROGRAM

## 2023-02-18 PROCEDURE — 99285 EMERGENCY DEPT VISIT HI MDM: CPT

## 2023-02-18 PROCEDURE — 96375 TX/PRO/DX INJ NEW DRUG ADDON: CPT

## 2023-02-18 RX ORDER — MORPHINE SULFATE 4 MG/ML
4 INJECTION INTRAVENOUS ONCE
Status: COMPLETED | OUTPATIENT
Start: 2023-02-18 | End: 2023-02-18

## 2023-02-18 RX ORDER — ONDANSETRON 4 MG/1
4 TABLET, FILM COATED ORAL
Qty: 20 TABLET | Refills: 0 | Status: SHIPPED | OUTPATIENT
Start: 2023-02-18

## 2023-02-18 RX ORDER — DICYCLOMINE HYDROCHLORIDE 10 MG/5ML
20 SOLUTION ORAL EVERY 6 HOURS
Qty: 200 ML | Refills: 0 | Status: SHIPPED | OUTPATIENT
Start: 2023-02-18 | End: 2023-02-23

## 2023-02-18 RX ORDER — FAMOTIDINE 20 MG/1
20 TABLET, FILM COATED ORAL 2 TIMES DAILY
Qty: 20 TABLET | Refills: 0 | Status: SHIPPED | OUTPATIENT
Start: 2023-02-18 | End: 2023-02-18 | Stop reason: SDUPTHER

## 2023-02-18 RX ORDER — ONDANSETRON 4 MG/1
4 TABLET, FILM COATED ORAL
Qty: 20 TABLET | Refills: 0 | Status: SHIPPED | OUTPATIENT
Start: 2023-02-18 | End: 2023-02-18 | Stop reason: SDUPTHER

## 2023-02-18 RX ORDER — FAMOTIDINE 20 MG/1
20 TABLET, FILM COATED ORAL 2 TIMES DAILY
Qty: 20 TABLET | Refills: 0 | Status: SHIPPED | OUTPATIENT
Start: 2023-02-18 | End: 2023-02-28

## 2023-02-18 RX ORDER — DICYCLOMINE HYDROCHLORIDE 10 MG/5ML
20 SOLUTION ORAL EVERY 6 HOURS
Qty: 200 ML | Refills: 0 | Status: SHIPPED | OUTPATIENT
Start: 2023-02-18 | End: 2023-02-18 | Stop reason: SDUPTHER

## 2023-02-18 RX ORDER — ONDANSETRON 2 MG/ML
4 INJECTION INTRAMUSCULAR; INTRAVENOUS
Status: COMPLETED | OUTPATIENT
Start: 2023-02-18 | End: 2023-02-18

## 2023-02-18 RX ADMIN — IOPAMIDOL 100 ML: 755 INJECTION, SOLUTION INTRAVENOUS at 05:54

## 2023-02-18 RX ADMIN — SODIUM CHLORIDE, PRESERVATIVE FREE 20 MG: 5 INJECTION INTRAVENOUS at 05:16

## 2023-02-18 RX ADMIN — ONDANSETRON 4 MG: 2 INJECTION INTRAMUSCULAR; INTRAVENOUS at 05:17

## 2023-02-18 RX ADMIN — MORPHINE SULFATE 4 MG: 4 INJECTION, SOLUTION INTRAMUSCULAR; INTRAVENOUS at 07:19

## 2023-02-18 RX ADMIN — SODIUM CHLORIDE 1000 ML: 9 INJECTION, SOLUTION INTRAVENOUS at 05:16

## 2023-02-18 NOTE — ED PROVIDER NOTES
[facility]  EMERGENCY DEPARTMENT ENCOUNTER NOTE    Date: 2/18/2023  Patient Name: Reshma Vasquez    History of Presenting Illness     Chief Complaint   Patient presents with    Vomiting    Shortness of Breath       History obtained from: Patient    HPI: Reshma Vasquez, 81 y.o. female with a past medical history and outpatient medications as listed and reviewed below  presents for vomiting. The patient has a 1 day history of nausea and vomiting that was described as nonbloody and nonbilious. Diarrhea was also present and was nonmelanotic. Her  has similar symptoms yesterday with nausea vomiting and diarrhea which resolved, and she started with symptoms today. They are unsure what they were exposed to. Abdominal pain was present and was described as cramping lower abdominal pain. Patient has not been able to tolerate PO intake. Patient denies lightheadedness, dizziness, syncope, palpitations, or decreased urine output. Patient denies fevers, chills, dysuria, hematuria, chest pain, shortness of breath, cough, rhinorrhea, sore throat, headache, or neck stiffness. No other acute complaints.     Medical History   I reviewed the medical, surgical, family, and social history, as well as allergies:    PCP: Urszula Skelton MD    Past Medical History:  Past Medical History:   Diagnosis Date    Arthritis     hands    Asthma     Bronchiectasis ([facility] [address]     Chronic pain     back (pt denies)    GERD (gastroesophageal reflux disease)     Hypertension     MAC (mycobacterium avium-intracellulare complex)     Other ill-defined conditions(799.89)     MAC- mycobacterium avium complex    Other ill-defined conditions(799.89)     restless leg syndrome    Other ill-defined conditions(799.89)     bronchiectasis    Psychiatric disorder     anxiety    Unspecified sleep apnea     wears CPAP at night     Past Surgical History:  Past Surgical History:   Procedure Laterality Date    HX APPENDECTOMY      HX CHOLECYSTECTOMY      HX HYSTERECTOMY  2002    HX MOHS PROCEDURES Right     HX ORTHOPAEDIC Left 2002    hemiarthrectomy    HX ORTHOPAEDIC Right     bunionectomy, hammer toe repair    HX ORTHOPAEDIC Left     3 shoulder surgeries - last was a reverse shoulder replacement    HX TONSILLECTOMY      MN UNLISTED PROCEDURE BREAST      breast reductions     Current Outpatient Medications:  Current Outpatient Medications   Medication Instructions    albuterol (ProAir HFA) 90 mcg/actuation inhaler 2 Puffs, Inhalation, EVERY 4 HOURS AS NEEDED    albuterol-ipratropium (DUO-NEB) 2.5 mg-0.5 mg/3 ml nebu 3 mL, Nebulization, EVERY 6 HOURS AS NEEDED    alclometasone (ACLOVATE) 0.05 % ointment Topical, 2 TIMES DAILY, apply thin layer as directed     allopurinoL (ZYLOPRIM) 150 mg, Oral, DAILY    arformoteroL (BROVANA) 15 mcg, Nebulization, 2 TIMES DAILY    aspirin 81 mg, Oral, 2 TIMES DAILY    azelastine (OPTIVAR) 0.05 % ophthalmic solution 1 Drop, Both Eyes, 2 TIMES DAILY    biotin 5,000 mcg subl 1 Tablet, SubLINGual, EVERY EVENING    budesonide (PULMICORT) 500 mcg, Nebulization, 2 TIMES DAILY    calcium citrate/vitamin D3 (CITRACAL + D PO) 1 Tablet, Oral, 2 TIMES DAILY    colchicine 0.6 mg, Oral, 2 TIMES DAILY    dicyclomine (BENTYL) 20 mg, Oral, EVERY 6 HOURS    famotidine (PEPCID) 20 mg, Oral, EVERY BEDTIME    famotidine (PEPCID) 20 mg, Oral, 2 TIMES DAILY    fluticasone (FLONASE) 50 mcg/actuation nasal spray 2 Sprays, Both Nostrils, 2 TIMES DAILY    furosemide (LASIX) 40 mg, Oral, DAILY    gabapentin (NEURONTIN) 100 mg, Oral, DAILY    ipratropium (ATROVENT) 0.02 % nebulizer solution Nebulization, 2 TIMES DAILY, Can use every 4 hrs as needed but does use it twice daily.      iron 18 mg tab 1 Tablet, Oral, DAILY    ketoconazole (NIZORAL) 2 % shampoo Topical, DAILY PRN    losartan-hydroCHLOROthiazide (HYZAAR) 100-12.5 mg per tablet 1 Tablet, Oral, DAILY    MAGNESIUM GLYCINATE  mg, Oral, EVERY EVENING metOLazone (ZAROXOLYN) 5 mg, Oral, DAILY    montelukast (SINGULAIR) 10 mg, DAILY    ondansetron hcl (ZOFRAN) 4 mg, Oral, EVERY 8 HOURS AS NEEDED    pantoprazole (PROTONIX) 40 mg, Oral, DAILY    potassium chloride (KLOR-CON M20) 20 mEq tablet 20 mEq, Oral, 2 TIMES DAILY    predniSONE (DELTASONE) 10 mg tablet Oral, DAILY WITH BREAKFAST    rOPINIRole (REQUIP) 0.5 mg, Oral, 2 TIMES DAILY, 1 tab in am, one tab in the middle of the day, and 3 tabs at night. rOPINIRole (REQUIP) 1.5 mg, Oral, EVERY BEDTIME    traZODone (DESYREL) 150 mg, Oral, EVERY BEDTIME    VENLAFAXINE HCL (EFFEXOR XR PO) 150 mg, Oral, DAILY      Family History:  Family History   Problem Relation Age of Onset    Stroke Mother     Lung Disease Father     Cancer Sister         bladder    OSTEOARTHRITIS Sister     OSTEOARTHRITIS Sister     Hypertension Sister     Heart Disease Sister     Other Brother         dies with complications with cholecystectomy     Social History:  Social History     Tobacco Use    Smoking status: Never    Smokeless tobacco: Never   Substance Use Topics    Alcohol use: No    Drug use: No     Allergies: Allergies   Allergen Reactions    Latex Other (comments)     Flu like symptoms    Clindamycin Nausea Only    Ace Inhibitors Unknown (comments)    Darvocet A500 [Propoxyphene N-Acetaminophen] Unknown (comments)     Not sure patient states it was on her 'chart'    Metronidazole Rash    Naproxen Unknown (comments)    Penicillins Rash     Not really sure patient had bruises at the time    Reglan [Metoclopramide] Other (comments)     Made patient feel like a 'zombie'    Simvastatin Unknown (comments)       Review of Systems     Positives and pertinent negatives as per HPI. All other systems were reviewed and are negative. Physical Exam and Vital Signs   Vital Signs - Reviewed the patient's vital signs.     Patient Vitals for the past 12 hrs:   Temp Pulse Resp BP SpO2   02/18/23 0711 -- (!) 101 18 (!) 135/119 95 %   02/18/23 0556 -- -- -- (!) 117/56 --   02/18/23 0525 -- 92 -- (!) 104/49 95 %   02/18/23 0326 98.6 °F (37 °C) (!) 101 28 95/62 98 %     Physical Exam:    GENERAL: awake, alert, cooperative, not in distress  HEENT:  * Pupils equal, EOMI  * Head atraumatic  CV:  * audible heart sounds  * warm and perfused extremities bilaterally  PULMONARY: Good air movement, no wheezes, no crackles  ABDOMEN/: soft, no distension, no guarding, no suprapubic tenderness, noted mild lower abdominal tenderness  EXTREMITIES/BACK: warm and perfused, no tenderness, no edema  SKIN: no rashes or signs of trauma  NEURO:  * Speech clear  * Moves U&LE to command    Medical Decision Making     Patient is a 81 y.o. female presenting for vomiting. Vitals reveal no significant abnormalities and physical exam reveals  lower abdominal tenderness . Based on the history, physical exam, risk factors, and vital signs, differential includes gastroenteritis, gastritis, GERD. However, given the abdominal tenderness, age, and 1 day duration of symptoms, will do CT scan. Consultant Discussions/Recs: None  Records Reviewed: Nursing Notes  Social Determinants of health affecting management: None    ED Course and Reassessment     ED Course:     ED Course as of 02/18/23 0755   Sat Feb 18, 2023   0419 Chest x-ray negative for acute pathology: no CXR evidence of pulmonary edema, pleural effusion, pneumothorax, or pneumonia. This study was interpreted by me and confirmed on report.   [SS]   0441 CBC does not show any evidence of acute process. Leukocytosis not present to suggest infection. Hemoglobin not suggestive of acute anemia. No significant electrolyte derangements. Creatinine is not elevated more than baseline range making HERMILO unlikely. No significant transaminitis noted. Normal bilirubin. Lipase is not significantly elevated. Trop 39, will trend. [SS]   4086 Second troponin with negative delta change. ACS ruled out per the high-sensitivity troponin algorithm. [SS]   9549 Abdominal CT scan did not show any evidence of acute process. [SS]      ED Course User Index  [SS] Evelina Irizarry MD       Reassessment:    The patient presents with nausea and vomiting. Based on the patient's clinical picture at this time and the evaluation as detailed above, I see no evidence for more malignant underlying processes. There is no significant evidence for significant dehydration requiring admission. Nausea and vomiting have improved during ED stay and patient tolerated PO intake. Vital signs have been reassuring. Repeat exam reveals resolution of abdominal tenderness. The possibility of more malignant occult pathology was discussed. It was discussed that the patient needs to return promptly with any worsening symptoms or changes, and that no emergency department workup, no matter how extensive, can definitively exclude some more serious processes early in the disease course, and that the emergency department workups can be falsely reassuring. Routine discharge counseling was given including the fact that any worsening, changing or persistent symptoms should prompt an immediate call or follow up with their primary physician or the emergency department. The importance of appropriate follow up was also discussed. More extensive discharge instructions were given in the patient's discharge paperwork. After completion of evaluation and discussion of results and diagnoses, all the questions were answered. If required, all follow up appointments and treatments were discussed and explained. Diagnosis     Clinical Impression:   1. Gastroenteritis        Final Disposition     DISCHARGED FROM EMERGENCY DEPARTMENT    Patient will be discharged from the Emergency Department in stable condition. All of the diagnostic tests were reviewed and any questions were answered. Diagnosis, results, follow up if applicable, and return precautions were discussed.  I have also put together printed discharge instructions for them that include: 1) educational information regarding their diagnosis, 2) how to care for their diagnosis at home, as well a 3) list of reasons why they would want to return to the ED prior to their follow-up appointment, should their condition change. Any labs or imaging done in the ED will be either printed with the discharge paperwork or available through [address] DISCHARGE PLAN:  1. Current Discharge Medication List        START taking these medications    Details   !! famotidine (Pepcid) 20 mg tablet Take 1 Tablet by mouth two (2) times a day for 10 days. Qty: 20 Tablet, Refills: 0      ondansetron hcl (Zofran) 4 mg tablet Take 1 Tablet by mouth every eight (8) hours as needed for Nausea or Vomiting. Qty: 20 Tablet, Refills: 0      dicyclomine (BENTYL) 10 mg/5 mL soln oral solution Take 10 mL by mouth every six (6) hours for 5 days. Qty: 200 mL, Refills: 0       !! - Potential duplicate medications found. Please discuss with provider. CONTINUE these medications which have NOT CHANGED    Details   albuterol-ipratropium (DUO-NEB) 2.5 mg-0.5 mg/3 ml nebu 3 mL by Nebulization route every six (6) hours as needed for Wheezing or Shortness of Breath (Cute shortness of breath not relieved by inhaler). Qty: 30 Nebule, Refills: 0      allopurinoL (ZYLOPRIM) 300 mg tablet Take 150 mg by mouth daily. colchicine 0.6 mg tablet Take 0.6 mg by mouth two (2) times a day. aspirin 81 mg chewable tablet Take 81 mg by mouth two (2) times a day. azelastine (OPTIVAR) 0.05 % ophthalmic solution Administer 1 Drop to both eyes two (2) times a day. metOLazone (ZAROXOLYN) 5 mg tablet Take 5 mg by mouth daily. losartan-hydroCHLOROthiazide (HYZAAR) 100-12.5 mg per tablet Take 1 Tablet by mouth daily. !! rOPINIRole (REQUIP) 0.5 mg tablet Take 1.5 mg by mouth nightly. pantoprazole (Protonix) 40 mg tablet Take 40 mg by mouth daily.  Indications: gastroesophageal reflux disease      furosemide (LASIX) 40 mg tablet Take 40 mg by mouth daily. !! famotidine (PEPCID) 20 mg tablet Take 20 mg by mouth nightly. albuterol (ProAir HFA) 90 mcg/actuation inhaler Take 2 Puffs by inhalation every four (4) hours as needed for Wheezing or Shortness of Breath. calcium citrate/vitamin D3 (CITRACAL + D PO) Take 1 Tablet by mouth two (2) times a day. MAGNESIUM GLYCINATE PO Take 665 mg by mouth every evening. iron 18 mg tab Take 1 Tablet by mouth daily. biotin 5,000 mcg subl 1 Tablet by SubLINGual route every evening. alclometasone (ACLOVATE) 0.05 % ointment Apply  to affected area two (2) times a day. apply thin layer as directed      ketoconazole (NIZORAL) 2 % shampoo Apply  to affected area daily as needed for Itching. predniSONE (DELTASONE) 10 mg tablet Take  by mouth daily (with breakfast). arformoterol (BROVANA) 15 mcg/2 mL nebu neb solution 15 mcg by Nebulization route two (2) times a day. budesonide (PULMICORT) 0.5 mg/2 mL nebulizer suspension 500 mcg by Nebulization route two (2) times a day. ipratropium (ATROVENT) 0.02 % nebulizer solution by Nebulization route two (2) times a day. Can use every 4 hrs as needed but does use it twice daily. potassium chloride (KLOR-CON M20) 20 mEq tablet Take 20 mEq by mouth two (2) times a day. montelukast (SINGULAIR) 10 mg tablet Take 10 mg by mouth daily. VENLAFAXINE HCL (EFFEXOR XR PO) Take 150 mg by mouth daily. !! rOPINIRole (REQUIP) 0.5 mg tablet Take 0.5 mg by mouth two (2) times a day. 1 tab in am, one tab in the middle of the day, and 3 tabs at night. traZODone (DESYREL) 100 mg tablet Take 150 mg by mouth nightly.      gabapentin (NEURONTIN) 100 mg capsule Take 100 mg by mouth daily. fluticasone (FLONASE) 50 mcg/actuation nasal spray 2 Sprays by Both Nostrils route two (2) times a day. !! - Potential duplicate medications found.  Please discuss with provider. 2.   Follow-up Information       Follow up With Specialties Details Why [address]    [address] [facility] EMERGENCY DEPT Emergency Medicine Go to  If symptoms worsen [address] [address]  [phone]    Your doctor  Schedule an appointment as soon as possible for a visit in 3 days            3. Return to ED if worse    4. Current Discharge Medication List        START taking these medications    Details   !! famotidine (Pepcid) 20 mg tablet Take 1 Tablet by mouth two (2) times a day for 10 days. Qty: 20 Tablet, Refills: 0  Start date: 2/18/2023, End date: 2/28/2023      ondansetron hcl (Zofran) 4 mg tablet Take 1 Tablet by mouth every eight (8) hours as needed for Nausea or Vomiting. Qty: 20 Tablet, Refills: 0  Start date: 2/18/2023      dicyclomine (BENTYL) 10 mg/5 mL soln oral solution Take 10 mL by mouth every six (6) hours for 5 days. Qty: 200 mL, Refills: 0  Start date: 2/18/2023, End date: 2/23/2023       !! - Potential duplicate medications found. Please discuss with provider. CONTINUE these medications which have NOT CHANGED    Details   !! famotidine (PEPCID) 20 mg tablet Take 20 mg by mouth nightly. !! - Potential duplicate medications found. Please discuss with provider. Procedures, Critical Care, & Clinical Tools   Performed by: Price Crain MD  Procedures     EKG interpretation (Preliminary interpretation by me):  Rhythm: normal sinus rhythm; and regular . Rate (approx.): 96.   Axis: normal;  NM interval: normal;  QRS interval: normal ;  ST/T wave: normal;  Other findings: normal.       Results, Consults, Medications     Consults:  None   Labs:  Recent Results (from the past 12 hour(s))   CBC WITH AUTOMATED DIFF    Collection Time: 02/18/23  4:01 AM   Result Value Ref Range    WBC 8.6 3.6 - 11.0 K/uL    RBC 3.96 3.80 - 5.20 M/uL    HGB 11.9 11.5 - 16.0 g/dL    HCT 36.9 35.0 - 47.0 %    MCV 93.2 80.0 - 99.0 FL    MCH 30.1 26.0 - 34.0 PG    MCHC 32.2 30.0 - 36.5 g/dL    RDW 15.2 (H) 11.5 - 14.5 %    PLATELET 533 367 - 688 K/uL    MPV 10.6 8.9 - 12.9 FL    NRBC 0.0 0.0  WBC    ABSOLUTE NRBC 0.00 0.00 - 0.01 K/uL    NEUTROPHILS 91 (H) 32 - 75 %    LYMPHOCYTES 3 (L) 12 - 49 %    MONOCYTES 6 5 - 13 %    EOSINOPHILS 0 0 - 7 %    BASOPHILS 0 0 - 1 %    IMMATURE GRANULOCYTES 0 0 - 0.5 %    ABS. NEUTROPHILS 7.8 1.8 - 8.0 K/UL    ABS. LYMPHOCYTES 0.3 (L) 0.8 - 3.5 K/UL    ABS. MONOCYTES 0.5 0.0 - 1.0 K/UL    ABS. EOSINOPHILS 0.0 0.0 - 0.4 K/UL    ABS. BASOPHILS 0.0 0.0 - 0.1 K/UL    ABS. IMM. GRANS. 0.0 0.00 - 0.04 K/UL    DF AUTOMATED     METABOLIC PANEL, COMPREHENSIVE    Collection Time: 02/18/23  4:01 AM   Result Value Ref Range    Sodium 136 136 - 145 mmol/L    Potassium 3.4 (L) 3.5 - 5.1 mmol/L    Chloride 103 97 - 108 mmol/L    CO2 24 21 - 32 mmol/L    Anion gap 9 5 - 15 mmol/L    Glucose 132 (H) 65 - 100 mg/dL    BUN 41 (H) 6 - 20 mg/dL    Creatinine 1.26 (H) 0.55 - 1.02 mg/dL    BUN/Creatinine ratio 33 (H) 12 - 20      eGFR 43 (L) >60 ml/min/1.73m2    Calcium 9.0 8.5 - 10.1 mg/dL    Bilirubin, total 0.6 0.2 - 1.0 mg/dL    AST (SGOT) 36 15 - 37 U/L    ALT (SGPT) 23 12 - 78 U/L    Alk. phosphatase 58 45 - 117 U/L    Protein, total 6.5 6.4 - 8.2 g/dL    Albumin 3.6 3.5 - 5.0 g/dL    Globulin 2.9 2.0 - 4.0 g/dL    A-G Ratio 1.2 1.1 - 2.2     TROPONIN-HIGH SENSITIVITY    Collection Time: 02/18/23  4:01 AM   Result Value Ref Range    Troponin-High Sensitivity 39 0 - 51 ng/L   LIPASE    Collection Time: 02/18/23  4:01 AM   Result Value Ref Range    Lipase 59 (L) 73 - 393 U/L   TROPONIN-HIGH SENSITIVITY    Collection Time: 02/18/23  6:35 AM   Result Value Ref Range    Troponin-High Sensitivity 32 0 - 51 ng/L     Radiologic Studies:  CT Results  (Last 48 hours)                 02/18/23 0600  CT ABD PELV W CONT Final result    Impression:  No acute process. Narrative:  INDICATION: lower bilateral abdominal tenderness and vomiting.        COMPARISON: None TECHNIQUE:   Following the uneventful intravenous administration of IV contrast, thin axial   images were obtained through the abdomen and pelvis. Coronal and sagittal   reconstructions were generated. Oral contrast was not administered. CT dose   reduction was achieved through use of a standardized protocol tailored for this   examination and automatic exposure control for dose modulation. FINDINGS:   LUNG BASES: No abnormality. LIVER: No mass or biliary dilatation. GALLBLADDER: Surgically absent. SPLEEN: No enlargement or lesion. PANCREAS: No mass or ductal dilatation. ADRENALS: No mass. KIDNEYS: No mass, calculus, or hydronephrosis. GI TRACT: No bowel obstruction. Sigmoid diverticulosis without diverticulitis. Difficult to assess bowel wall thickening given lack of oral contrast material.   Small hiatal hernia. PERITONEUM: No free air or free fluid. APPENDIX: Not visualized. RETROPERITONEUM: No aortic aneurysm. LYMPH NODES: None enlarged. ADDITIONAL COMMENTS: N/A.       URINARY BLADDER: Unremarkable. REPRODUCTIVE ORGANS: Prior hysterectomy. LYMPH NODES: None enlarged. FREE FLUID: None. BONES: Degenerative changes throughout the lumbar spine. No destructive bone   lesion. ADDITIONAL COMMENTS: Fat-containing bilateral inguinal hernias. Small   fat-containing umbilical hernia. CXR Results  (Last 48 hours)                 02/18/23 0338  XR CHEST PORT Final result    Impression:  No acute findings. Narrative:  EXAM:  XR CHEST PORT       INDICATION: Shortness of breath. COMPARISON: Chest x-ray and CT 2/18/2022. TECHNIQUE: Upright portable chest AP view       FINDINGS:  The cardiac silhouette is within normal limits. The pulmonary   vasculature is within normal limits. The lungs and pleural spaces are clear.  The visualized bones and upper abdomen   redemonstrate left shoulder arthroplasty prosthesis but otherwise are age-appropriate. Medications ordered:  Medications   sodium chloride 0.9 % bolus infusion 1,000 mL (0 mL IntraVENous IV Completed 2/18/23 0626)   ondansetron (ZOFRAN) injection 4 mg (4 mg IntraVENous Given 2/18/23 0517)   famotidine (PF) (PEPCID) 20 mg in 0.9% sodium chloride 10 mL injection (20 mg IntraVENous Given 2/18/23 0516)   iopamidoL (ISOVUE-370) 370 mg iodine /mL (76 %) injection 100 mL (100 mL IntraVENous Given 2/18/23 0554)   morphine injection 4 mg (4 mg IntraVENous Given 2/18/23 0719)       Documentation Comments   - I am the first and primary provider for this patient and am the primary provider of record. - Initial assessment performed. The patients presenting problems have been discussed, and the staff are in agreement with the care plan formulated and outlined with them. I have encouraged them to ask questions as they arise throughout their visit. - Available medical records, nursing notes, old EKGs, and EMS run sheets (if patient was EMS transported) were reviewed    Please note that this dictation was completed with [employer], the [employer] voice recognition software. Quite often unanticipated grammatical, syntax, homophones, and other interpretive errors are inadvertently transcribed by the computer software. Please disregard these errors. Please excuse any errors that have escaped final proofreading.

## 2023-02-18 NOTE — ED TRIAGE NOTES
Patient states she started with n/v, anxiousness, and SOB yesterday morning; pt's  with similar sx recently

## 2023-02-18 NOTE — DISCHARGE INSTRUCTIONS
Thank you! Thank you for allowing me to care for you in the emergency department. I sincerely hope that you are satisfied with your visit today. It is my goal to provide you with excellent care. Below you will find a list of your labs and imaging from your visit today if applicable. Should you have any questions regarding these results please do not hesitate to call the emergency department. Please review [facility] for a more detailed result list since the below list may not be comprehensive. Instructions on how to sign up to [facility] should be provided in this packet. Labs -     Recent Results (from the past 12 hour(s))   CBC WITH AUTOMATED DIFF    Collection Time: 02/18/23  4:01 AM   Result Value Ref Range    WBC 8.6 3.6 - 11.0 K/uL    RBC 3.96 3.80 - 5.20 M/uL    HGB 11.9 11.5 - 16.0 g/dL    HCT 36.9 35.0 - 47.0 %    MCV 93.2 80.0 - 99.0 FL    MCH 30.1 26.0 - 34.0 PG    MCHC 32.2 30.0 - 36.5 g/dL    RDW 15.2 (H) 11.5 - 14.5 %    PLATELET 707 800 - 407 K/uL    MPV 10.6 8.9 - 12.9 FL    NRBC 0.0 0.0  WBC    ABSOLUTE NRBC 0.00 0.00 - 0.01 K/uL    NEUTROPHILS 91 (H) 32 - 75 %    LYMPHOCYTES 3 (L) 12 - 49 %    MONOCYTES 6 5 - 13 %    EOSINOPHILS 0 0 - 7 %    BASOPHILS 0 0 - 1 %    IMMATURE GRANULOCYTES 0 0 - 0.5 %    ABS. NEUTROPHILS 7.8 1.8 - 8.0 K/UL    ABS. LYMPHOCYTES 0.3 (L) 0.8 - 3.5 K/UL    ABS. MONOCYTES 0.5 0.0 - 1.0 K/UL    ABS. EOSINOPHILS 0.0 0.0 - 0.4 K/UL    ABS. BASOPHILS 0.0 0.0 - 0.1 K/UL    ABS. IMM.  GRANS. 0.0 0.00 - 0.04 K/UL    DF AUTOMATED     METABOLIC PANEL, COMPREHENSIVE    Collection Time: 02/18/23  4:01 AM   Result Value Ref Range    Sodium 136 136 - 145 mmol/L    Potassium 3.4 (L) 3.5 - 5.1 mmol/L    Chloride 103 97 - 108 mmol/L    CO2 24 21 - 32 mmol/L    Anion gap 9 5 - 15 mmol/L    Glucose 132 (H) 65 - 100 mg/dL    BUN 41 (H) 6 - 20 mg/dL    Creatinine 1.26 (H) 0.55 - 1.02 mg/dL    BUN/Creatinine ratio 33 (H) 12 - 20      eGFR 43 (L) >60 ml/min/1.73m2    Calcium 9.0 8.5 - 10.1 mg/dL    Bilirubin, total 0.6 0.2 - 1.0 mg/dL    AST (SGOT) 36 15 - 37 U/L    ALT (SGPT) 23 12 - 78 U/L    Alk. phosphatase 58 45 - 117 U/L    Protein, total 6.5 6.4 - 8.2 g/dL    Albumin 3.6 3.5 - 5.0 g/dL    Globulin 2.9 2.0 - 4.0 g/dL    A-G Ratio 1.2 1.1 - 2.2     TROPONIN-HIGH SENSITIVITY    Collection Time: 02/18/23  4:01 AM   Result Value Ref Range    Troponin-High Sensitivity 39 0 - 51 ng/L   LIPASE    Collection Time: 02/18/23  4:01 AM   Result Value Ref Range    Lipase 59 (L) 73 - 393 U/L   TROPONIN-HIGH SENSITIVITY    Collection Time: 02/18/23  6:35 AM   Result Value Ref Range    Troponin-High Sensitivity 32 0 - 51 ng/L       Radiologic Studies -   CT ABD PELV W CONT   Final Result   No acute process. XR CHEST PORT   Final Result   No acute findings. CT Results  (Last 48 hours)                 02/18/23 0600  CT ABD PELV W CONT Final result    Impression:  No acute process. Narrative:  INDICATION: lower bilateral abdominal tenderness and vomiting. COMPARISON: None       TECHNIQUE:   Following the uneventful intravenous administration of IV contrast, thin axial   images were obtained through the abdomen and pelvis. Coronal and sagittal   reconstructions were generated. Oral contrast was not administered. CT dose   reduction was achieved through use of a standardized protocol tailored for this   examination and automatic exposure control for dose modulation. FINDINGS:   LUNG BASES: No abnormality. LIVER: No mass or biliary dilatation. GALLBLADDER: Surgically absent. SPLEEN: No enlargement or lesion. PANCREAS: No mass or ductal dilatation. ADRENALS: No mass. KIDNEYS: No mass, calculus, or hydronephrosis. GI TRACT: No bowel obstruction. Sigmoid diverticulosis without diverticulitis. Difficult to assess bowel wall thickening given lack of oral contrast material.   Small hiatal hernia. PERITONEUM: No free air or free fluid. APPENDIX: Not visualized. RETROPERITONEUM: No aortic aneurysm. LYMPH NODES: None enlarged. ADDITIONAL COMMENTS: N/A.       URINARY BLADDER: Unremarkable. REPRODUCTIVE ORGANS: Prior hysterectomy. LYMPH NODES: None enlarged. FREE FLUID: None. BONES: Degenerative changes throughout the lumbar spine. No destructive bone   lesion. ADDITIONAL COMMENTS: Fat-containing bilateral inguinal hernias. Small   fat-containing umbilical hernia. CXR Results  (Last 48 hours)                 02/18/23 0338  XR CHEST PORT Final result    Impression:  No acute findings. Narrative:  EXAM:  XR CHEST PORT       INDICATION: Shortness of breath. COMPARISON: Chest x-ray and CT 2/18/2022. TECHNIQUE: Upright portable chest AP view       FINDINGS:  The cardiac silhouette is within normal limits. The pulmonary   vasculature is within normal limits. The lungs and pleural spaces are clear. The visualized bones and upper abdomen   redemonstrate left shoulder arthroplasty prosthesis but otherwise are   age-appropriate. If you feel that you have not received excellent quality care or timely care, please ask to speak to the nurse manager. Please choose us in the future for your continued health care needs. ------------------------------------------------------------------------------------------------------------  The exam and treatment you received in the Emergency Department were for an urgent problem and are not intended as complete care. It is important that you follow-up with a doctor, nurse practitioner, or physician assistant to:  (1) confirm your diagnosis,  (2) re-evaluation of changes in your illness and treatment, and  (3) for ongoing care. If your symptoms become worse or you do not improve as expected and you are unable to reach your usual health care provider, you should return to the Emergency Department. We are available 24 hours a day.      Please take your discharge instructions with you when you go to your follow-up appointment. If a prescription has been provided, please have it filled as soon as possible to prevent a delay in treatment. Read the entire medication instruction sheet provided to you by the pharmacy. If you have any questions or reservations about taking the medication due to side effects or interactions with other medications, please call your primary care physician or contact the ER to speak with the charge nurse. Make an appointment with your family doctor or the physician you were referred to for follow-up of this visit as instructed on your discharge paperwork, as this is a mandatory follow-up. Return to the ER if you are unable to be seen or if you are unable to be seen in a timely manner. If you have any problem arranging the follow-up visit, contact the Emergency Department immediately.

## 2024-03-04 ENCOUNTER — APPOINTMENT (OUTPATIENT)
Facility: HOSPITAL | Age: 84
End: 2024-03-04
Payer: MEDICARE

## 2024-03-04 ENCOUNTER — HOSPITAL ENCOUNTER (EMERGENCY)
Facility: HOSPITAL | Age: 84
Discharge: HOME | End: 2024-03-04
Payer: MEDICARE

## 2024-03-04 VITALS
HEIGHT: 61 IN | DIASTOLIC BLOOD PRESSURE: 86 MMHG | WEIGHT: 150 LBS | SYSTOLIC BLOOD PRESSURE: 156 MMHG | TEMPERATURE: 98 F | HEART RATE: 82 BPM | BODY MASS INDEX: 28.32 KG/M2 | OXYGEN SATURATION: 97 % | RESPIRATION RATE: 16 BRPM

## 2024-03-04 DIAGNOSIS — R10.9 ABDOMINAL PAIN, UNSPECIFIED ABDOMINAL LOCATION: ICD-10-CM

## 2024-03-04 DIAGNOSIS — K59.00 CONSTIPATION, UNSPECIFIED CONSTIPATION TYPE: Primary | ICD-10-CM

## 2024-03-04 LAB
ALBUMIN SERPL-MCNC: 3.8 G/DL (ref 3.5–5)
ALBUMIN/GLOB SERPL: 1.3 (ref 1.1–2.2)
ALP SERPL-CCNC: 96 U/L (ref 45–117)
ALT SERPL-CCNC: 21 U/L (ref 12–78)
ANION GAP SERPL CALC-SCNC: 0 MMOL/L (ref 5–15)
AST SERPL W P-5'-P-CCNC: 19 U/L (ref 15–37)
BASOPHILS # BLD: 0 K/UL (ref 0–0.1)
BASOPHILS NFR BLD: 1 % (ref 0–1)
BILIRUB SERPL-MCNC: 0.4 MG/DL (ref 0.2–1)
BUN SERPL-MCNC: 17 MG/DL (ref 6–20)
BUN/CREAT SERPL: 19 (ref 12–20)
CA-I BLD-MCNC: 9.5 MG/DL (ref 8.5–10.1)
CHLORIDE SERPL-SCNC: 109 MMOL/L (ref 97–108)
CO2 SERPL-SCNC: 32 MMOL/L (ref 21–32)
CREAT SERPL-MCNC: 0.89 MG/DL (ref 0.55–1.02)
DIFFERENTIAL METHOD BLD: ABNORMAL
EOSINOPHIL # BLD: 0.3 K/UL (ref 0–0.4)
EOSINOPHIL NFR BLD: 6 % (ref 0–7)
ERYTHROCYTE [DISTWIDTH] IN BLOOD BY AUTOMATED COUNT: 13.8 % (ref 11.5–14.5)
GLOBULIN SER CALC-MCNC: 2.9 G/DL (ref 2–4)
GLUCOSE SERPL-MCNC: 96 MG/DL (ref 65–100)
HCT VFR BLD AUTO: 34.3 % (ref 35–47)
HGB BLD-MCNC: 11 G/DL (ref 11.5–16)
IMM GRANULOCYTES # BLD AUTO: 0 K/UL (ref 0–0.04)
IMM GRANULOCYTES NFR BLD AUTO: 1 % (ref 0–0.5)
LYMPHOCYTES # BLD: 1.3 K/UL (ref 0.8–3.5)
LYMPHOCYTES NFR BLD: 29 % (ref 12–49)
MCH RBC QN AUTO: 31 PG (ref 26–34)
MCHC RBC AUTO-ENTMCNC: 32.1 G/DL (ref 30–36.5)
MCV RBC AUTO: 96.6 FL (ref 80–99)
MONOCYTES # BLD: 0.4 K/UL (ref 0–1)
MONOCYTES NFR BLD: 8 % (ref 5–13)
NEUTS SEG # BLD: 2.4 K/UL (ref 1.8–8)
NEUTS SEG NFR BLD: 55 % (ref 32–75)
NRBC # BLD: 0 K/UL (ref 0–0.01)
NRBC BLD-RTO: 0 PER 100 WBC
PLATELET # BLD AUTO: 188 K/UL (ref 150–400)
PMV BLD AUTO: 9.8 FL (ref 8.9–12.9)
POTASSIUM SERPL-SCNC: 3.7 MMOL/L (ref 3.5–5.1)
PROT SERPL-MCNC: 6.7 G/DL (ref 6.4–8.2)
RBC # BLD AUTO: 3.55 M/UL (ref 3.8–5.2)
SODIUM SERPL-SCNC: 141 MMOL/L (ref 136–145)
WBC # BLD AUTO: 4.3 K/UL (ref 3.6–11)

## 2024-03-04 PROCEDURE — 96374 THER/PROPH/DIAG INJ IV PUSH: CPT

## 2024-03-04 PROCEDURE — 99285 EMERGENCY DEPT VISIT HI MDM: CPT

## 2024-03-04 PROCEDURE — 74177 CT ABD & PELVIS W/CONTRAST: CPT

## 2024-03-04 PROCEDURE — 36415 COLL VENOUS BLD VENIPUNCTURE: CPT

## 2024-03-04 PROCEDURE — 6360000004 HC RX CONTRAST MEDICATION

## 2024-03-04 PROCEDURE — 96376 TX/PRO/DX INJ SAME DRUG ADON: CPT

## 2024-03-04 PROCEDURE — 85025 COMPLETE CBC W/AUTO DIFF WBC: CPT

## 2024-03-04 PROCEDURE — 80053 COMPREHEN METABOLIC PANEL: CPT

## 2024-03-04 PROCEDURE — 6360000002 HC RX W HCPCS

## 2024-03-04 RX ORDER — DICYCLOMINE HCL 20 MG
20 TABLET ORAL 4 TIMES DAILY PRN
Qty: 15 TABLET | Refills: 0 | Status: SHIPPED | OUTPATIENT
Start: 2024-03-04

## 2024-03-04 RX ORDER — MORPHINE SULFATE 2 MG/ML
2 INJECTION, SOLUTION INTRAMUSCULAR; INTRAVENOUS
Status: COMPLETED | OUTPATIENT
Start: 2024-03-04 | End: 2024-03-04

## 2024-03-04 RX ADMIN — MORPHINE SULFATE 2 MG: 2 INJECTION, SOLUTION INTRAMUSCULAR; INTRAVENOUS at 14:59

## 2024-03-04 RX ADMIN — MORPHINE SULFATE 2 MG: 2 INJECTION, SOLUTION INTRAMUSCULAR; INTRAVENOUS at 12:28

## 2024-03-04 RX ADMIN — IOPAMIDOL 100 ML: 755 INJECTION, SOLUTION INTRAVENOUS at 13:56

## 2024-03-04 ASSESSMENT — LIFESTYLE VARIABLES
HOW OFTEN DO YOU HAVE A DRINK CONTAINING ALCOHOL: NEVER
HOW MANY STANDARD DRINKS CONTAINING ALCOHOL DO YOU HAVE ON A TYPICAL DAY: PATIENT DOES NOT DRINK

## 2024-03-04 ASSESSMENT — PAIN - FUNCTIONAL ASSESSMENT: PAIN_FUNCTIONAL_ASSESSMENT: NONE - DENIES PAIN

## 2024-03-04 ASSESSMENT — PAIN DESCRIPTION - LOCATION: LOCATION: ABDOMEN

## 2024-03-04 ASSESSMENT — PAIN SCALES - GENERAL: PAINLEVEL_OUTOF10: 9

## 2024-03-04 NOTE — ED TRIAGE NOTES
Patient states PCP advised her to come to ED due to constipation. Patient states she has not had a full bowel movement in over a week but passes small pieces of stool.

## 2024-03-04 NOTE — ED PROVIDER NOTES
nebulizer solution  Commonly known as: DUONEB     ketoconazole 2 % shampoo  Commonly known as: NIZORAL     losartan-hydroCHLOROthiazide 100-12.5 MG per tablet  Commonly known as: HYZAAR     MAGNESIUM GLYCINATE PO     metOLazone 5 MG tablet  Commonly known as: ZAROXOLYN     montelukast 10 MG tablet  Commonly known as: SINGULAIR     ondansetron 4 MG tablet  Commonly known as: ZOFRAN     pantoprazole 40 MG tablet  Commonly known as: PROTONIX     potassium chloride 20 MEQ extended release tablet  Commonly known as: KLOR-CON M     predniSONE 10 MG tablet  Commonly known as: DELTASONE     rOPINIRole 0.5 MG tablet  Commonly known as: REQUIP     traZODone 100 MG tablet  Commonly known as: DESYREL                DISCONTINUED MEDICATIONS:  Current Discharge Medication List          I am the Primary Clinician of Record: ALFRED Verduzco NP (electronically signed)    (Please note that parts of this dictation were completed with voice recognition software. Quite often unanticipated grammatical, syntax, homophones, and other interpretive errors are inadvertently transcribed by the computer software. Please disregards these errors. Please excuse any errors that have escaped final proofreading.)     Martha Merritt APRN - NP  03/04/24 3747

## 2024-03-04 NOTE — DISCHARGE INSTRUCTIONS
Thank you!  Thank you for allowing me to care for you in the emergency department. It is my goal to provide you with excellent care.  Please fill out the survey that will come to you by mail or email since we listen to your feedback!     Below you will find a list of your tests from today's visit.  Should you have any questions, please do not hesitate to call the emergency department.    Labs  Recent Results (from the past 12 hour(s))   CBC with Auto Differential    Collection Time: 03/04/24 12:25 PM   Result Value Ref Range    WBC 4.3 3.6 - 11.0 K/uL    RBC 3.55 (L) 3.80 - 5.20 M/uL    Hemoglobin 11.0 (L) 11.5 - 16.0 g/dL    Hematocrit 34.3 (L) 35.0 - 47.0 %    MCV 96.6 80.0 - 99.0 FL    MCH 31.0 26.0 - 34.0 PG    MCHC 32.1 30.0 - 36.5 g/dL    RDW 13.8 11.5 - 14.5 %    Platelets 188 150 - 400 K/uL    MPV 9.8 8.9 - 12.9 FL    Nucleated RBCs 0.0 0.0  WBC    nRBC 0.00 0.00 - 0.01 K/uL    Neutrophils % 55 32 - 75 %    Lymphocytes % 29 12 - 49 %    Monocytes % 8 5 - 13 %    Eosinophils % 6 0 - 7 %    Basophils % 1 0 - 1 %    Immature Granulocytes 1 (H) 0 - 0.5 %    Neutrophils Absolute 2.4 1.8 - 8.0 K/UL    Lymphocytes Absolute 1.3 0.8 - 3.5 K/UL    Monocytes Absolute 0.4 0.0 - 1.0 K/UL    Eosinophils Absolute 0.3 0.0 - 0.4 K/UL    Basophils Absolute 0.0 0.0 - 0.1 K/UL    Absolute Immature Granulocyte 0.0 0.00 - 0.04 K/UL    Differential Type AUTOMATED     CMP    Collection Time: 03/04/24 12:25 PM   Result Value Ref Range    Sodium 141 136 - 145 mmol/L    Potassium 3.7 3.5 - 5.1 mmol/L    Chloride 109 (H) 97 - 108 mmol/L    CO2 32 21 - 32 mmol/L    Anion Gap 0 (L) 5 - 15 mmol/L    Glucose 96 65 - 100 mg/dL    BUN 17 6 - 20 mg/dL    Creatinine 0.89 0.55 - 1.02 mg/dL    Bun/Cre Ratio 19 12 - 20      Est, Glom Filt Rate >60 >60 ml/min/1.73m2    Calcium 9.5 8.5 - 10.1 mg/dL    Total Bilirubin 0.4 0.2 - 1.0 mg/dL    AST 19 15 - 37 U/L    ALT 21 12 - 78 U/L    Alk Phosphatase 96 45 - 117 U/L    Total Protein 6.7

## 2024-04-29 ENCOUNTER — HOSPITAL ENCOUNTER (EMERGENCY)
Facility: HOSPITAL | Age: 84
Discharge: HOME | End: 2024-04-29
Attending: EMERGENCY MEDICINE
Payer: MEDICARE

## 2024-04-29 ENCOUNTER — APPOINTMENT (OUTPATIENT)
Facility: HOSPITAL | Age: 84
End: 2024-04-29
Payer: MEDICARE

## 2024-04-29 VITALS
SYSTOLIC BLOOD PRESSURE: 158 MMHG | BODY MASS INDEX: 30.43 KG/M2 | HEART RATE: 74 BPM | WEIGHT: 155 LBS | DIASTOLIC BLOOD PRESSURE: 81 MMHG | RESPIRATION RATE: 18 BRPM | TEMPERATURE: 97.5 F | OXYGEN SATURATION: 93 % | HEIGHT: 60 IN

## 2024-04-29 DIAGNOSIS — R10.9 ABDOMINAL PAIN, UNSPECIFIED ABDOMINAL LOCATION: Primary | ICD-10-CM

## 2024-04-29 LAB
ALBUMIN SERPL-MCNC: 3.8 G/DL (ref 3.5–5)
ALBUMIN/GLOB SERPL: 1.2 (ref 1.1–2.2)
ALP SERPL-CCNC: 110 U/L (ref 45–117)
ALT SERPL-CCNC: 17 U/L (ref 12–78)
ANION GAP SERPL CALC-SCNC: 3 MMOL/L (ref 5–15)
AST SERPL-CCNC: 22 U/L (ref 15–37)
BASOPHILS # BLD: 0 K/UL (ref 0–0.1)
BASOPHILS NFR BLD: 0 % (ref 0–1)
BILIRUB SERPL-MCNC: 0.5 MG/DL (ref 0.2–1)
BUN SERPL-MCNC: 20 MG/DL (ref 6–20)
BUN/CREAT SERPL: 19 (ref 12–20)
CALCIUM SERPL-MCNC: 9.4 MG/DL (ref 8.5–10.1)
CHLORIDE SERPL-SCNC: 109 MMOL/L (ref 97–108)
CO2 SERPL-SCNC: 29 MMOL/L (ref 21–32)
COMMENT:: NORMAL
CREAT SERPL-MCNC: 1.03 MG/DL (ref 0.55–1.02)
DIFFERENTIAL METHOD BLD: ABNORMAL
EOSINOPHIL # BLD: 0.1 K/UL (ref 0–0.4)
EOSINOPHIL NFR BLD: 2 % (ref 0–7)
ERYTHROCYTE [DISTWIDTH] IN BLOOD BY AUTOMATED COUNT: 14.3 % (ref 11.5–14.5)
GLOBULIN SER CALC-MCNC: 3.3 G/DL (ref 2–4)
GLUCOSE SERPL-MCNC: 97 MG/DL (ref 65–100)
HCT VFR BLD AUTO: 37.4 % (ref 35–47)
HGB BLD-MCNC: 12.2 G/DL (ref 11.5–16)
IMM GRANULOCYTES # BLD AUTO: 0.1 K/UL (ref 0–0.04)
IMM GRANULOCYTES NFR BLD AUTO: 1 % (ref 0–0.5)
LIPASE SERPL-CCNC: 58 U/L (ref 13–75)
LYMPHOCYTES # BLD: 1.7 K/UL (ref 0.8–3.5)
LYMPHOCYTES NFR BLD: 25 % (ref 12–49)
MCH RBC QN AUTO: 31.4 PG (ref 26–34)
MCHC RBC AUTO-ENTMCNC: 32.6 G/DL (ref 30–36.5)
MCV RBC AUTO: 96.1 FL (ref 80–99)
MONOCYTES # BLD: 0.5 K/UL (ref 0–1)
MONOCYTES NFR BLD: 7 % (ref 5–13)
NEUTS SEG # BLD: 4.5 K/UL (ref 1.8–8)
NEUTS SEG NFR BLD: 65 % (ref 32–75)
NRBC # BLD: 0 K/UL (ref 0–0.01)
NRBC BLD-RTO: 0 PER 100 WBC
PLATELET # BLD AUTO: 219 K/UL (ref 150–400)
PMV BLD AUTO: 10.1 FL (ref 8.9–12.9)
POTASSIUM SERPL-SCNC: 3.8 MMOL/L (ref 3.5–5.1)
PROT SERPL-MCNC: 7.1 G/DL (ref 6.4–8.2)
RBC # BLD AUTO: 3.89 M/UL (ref 3.8–5.2)
SODIUM SERPL-SCNC: 141 MMOL/L (ref 136–145)
SPECIMEN HOLD: NORMAL
WBC # BLD AUTO: 6.9 K/UL (ref 3.6–11)

## 2024-04-29 PROCEDURE — 99285 EMERGENCY DEPT VISIT HI MDM: CPT

## 2024-04-29 PROCEDURE — 6360000004 HC RX CONTRAST MEDICATION: Performed by: RADIOLOGY

## 2024-04-29 PROCEDURE — 80053 COMPREHEN METABOLIC PANEL: CPT

## 2024-04-29 PROCEDURE — 85025 COMPLETE CBC W/AUTO DIFF WBC: CPT

## 2024-04-29 PROCEDURE — 74177 CT ABD & PELVIS W/CONTRAST: CPT

## 2024-04-29 PROCEDURE — 83690 ASSAY OF LIPASE: CPT

## 2024-04-29 PROCEDURE — 96374 THER/PROPH/DIAG INJ IV PUSH: CPT

## 2024-04-29 PROCEDURE — 6360000002 HC RX W HCPCS: Performed by: EMERGENCY MEDICINE

## 2024-04-29 RX ORDER — ONDANSETRON 2 MG/ML
4 INJECTION INTRAMUSCULAR; INTRAVENOUS ONCE
Status: DISCONTINUED | OUTPATIENT
Start: 2024-04-29 | End: 2024-04-29 | Stop reason: HOSPADM

## 2024-04-29 RX ORDER — MORPHINE SULFATE 4 MG/ML
4 INJECTION, SOLUTION INTRAMUSCULAR; INTRAVENOUS
Status: COMPLETED | OUTPATIENT
Start: 2024-04-29 | End: 2024-04-29

## 2024-04-29 RX ADMIN — IOPAMIDOL 100 ML: 755 INJECTION, SOLUTION INTRAVENOUS at 06:44

## 2024-04-29 RX ADMIN — MORPHINE SULFATE 4 MG: 4 INJECTION INTRAVENOUS at 06:29

## 2024-04-29 ASSESSMENT — PAIN DESCRIPTION - ORIENTATION
ORIENTATION: RIGHT;LOWER
ORIENTATION: RIGHT;LOWER

## 2024-04-29 ASSESSMENT — PAIN SCALES - GENERAL
PAINLEVEL_OUTOF10: 9
PAINLEVEL_OUTOF10: 3
PAINLEVEL_OUTOF10: 9

## 2024-04-29 ASSESSMENT — PAIN DESCRIPTION - LOCATION
LOCATION: ABDOMEN

## 2024-04-29 NOTE — ED PROVIDER NOTES
Patient signed out to me by Dr. Christensen pending results of CAT scan.  CT was unremarkable.  Some evidence of diverticulosis but no signs of diverticulitis.  No other explanation for her abdominal pain.  Patient is resting comfortably with normal vital signs.  Labs also unremarkable.  Stable for discharge home.  She has an appointment coming up with gastroenterology to further investigate her symptoms.  She can return to the ER at anytime with new or worsening symptoms.     Miko Alfonso MD  04/29/24 0889    
dictation.    EMERGENCY DEPARTMENT COURSE and DIFFERENTIAL DIAGNOSIS/MDM:   Vitals:    Vitals:    04/29/24 0517 04/29/24 0521 04/29/24 0545 04/29/24 0628   BP:  (!) 176/108 (!) 168/90    Pulse: 83      Resp: 18      Temp: 97.5 °F (36.4 °C)      SpO2: 96%  95%    Weight:    70.3 kg (155 lb)   Height:    1.524 m (5')           Medical Decision Making  Patient arrives as above.  Here, patient is endorsing some abdominal pain has been on and off for the last several months with changes in her bowel movements over the last several months.  She is awaiting GI appointment in June.  Here, she endorses decreased bowel movements and nausea over the last several days.  Blood work and CT are obtained.  Pending results.  Differential diagnoses include but not limited to bowel obstructions colitis diverticulitis and the like.  Will sign out to oncoming physician for final disposition.  Favor more chronic type discomfort and probable discharge home with outpatient follow-up.  Patient is aware of this potential plan.  This is been discussed with oncoming physician.    7:14 AM  Change of shift. Care of patient turned over to Dr. Alfonso; H&P reviewed, handoff complete.  Awaiting CT      Amount and/or Complexity of Data Reviewed  Labs: ordered. Decision-making details documented in ED Course.  Radiology: ordered.    Risk  Prescription drug management.            REASSESSMENT            CONSULTS:  None    PROCEDURES:  Unless otherwise noted below, none     Procedures      FINAL IMPRESSION    No diagnosis found.      DISPOSITION/PLAN   DISPOSITION        PATIENT REFERRED TO:  No follow-up provider specified.    DISCHARGE MEDICATIONS:  New Prescriptions    No medications on file         (Please note that portions of this note were completed with a voice recognition program.  Efforts were made to edit the dictations but occasionally words are mis-transcribed.)    Adonay Christensen, DO (electronically signed)  Emergency Attending Physician

## 2024-04-29 NOTE — ED NOTES
Bedside and Verbal shift change report given to Sue (oncoming nurse) by Lolita (offgoing nurse). Report included the following information Nurse Handoff Report, Index, ED Encounter Summary, ED SBAR, Adult Overview, MAR, and Recent Results.

## 2024-04-29 NOTE — ED TRIAGE NOTES
Pt to ED ambulatory c/o abd pain since January. Pt reports not having a \"normal\" bowel movement since March. Pt has had an appendectomy and cholecystectomy.

## 2025-01-15 ENCOUNTER — APPOINTMENT (OUTPATIENT)
Facility: HOSPITAL | Age: 85
End: 2025-01-15
Payer: MEDICARE

## 2025-01-15 ENCOUNTER — HOSPITAL ENCOUNTER (INPATIENT)
Facility: HOSPITAL | Age: 85
LOS: 3 days | Discharge: HOME | End: 2025-01-18
Attending: EMERGENCY MEDICINE | Admitting: INTERNAL MEDICINE
Payer: MEDICARE

## 2025-01-15 DIAGNOSIS — A41.9 SEPTICEMIA (HCC): Primary | ICD-10-CM

## 2025-01-15 DIAGNOSIS — J10.1 INFLUENZA A: ICD-10-CM

## 2025-01-15 PROBLEM — J96.01 ACUTE HYPOXIC RESPIRATORY FAILURE: Status: ACTIVE | Noted: 2025-01-15

## 2025-01-15 LAB
ALBUMIN SERPL-MCNC: 3.5 G/DL (ref 3.5–5)
ALBUMIN/GLOB SERPL: 1.1 (ref 1.1–2.2)
ALP SERPL-CCNC: 102 U/L (ref 45–117)
ALT SERPL-CCNC: 22 U/L (ref 12–78)
ANION GAP SERPL CALC-SCNC: 8 MMOL/L (ref 2–12)
APPEARANCE UR: CLEAR
AST SERPL W P-5'-P-CCNC: 32 U/L (ref 15–37)
BACTERIA URNS QL MICRO: NEGATIVE /HPF
BASE EXCESS BLD CALC-SCNC: 5.5 MMOL/L
BASOPHILS # BLD: 0.01 K/UL (ref 0–0.1)
BASOPHILS NFR BLD: 0.1 % (ref 0–1)
BILIRUB SERPL-MCNC: 0.4 MG/DL (ref 0.2–1)
BILIRUB UR QL: NEGATIVE
BNP SERPL-MCNC: 504 PG/ML
BUN SERPL-MCNC: 17 MG/DL (ref 6–20)
BUN/CREAT SERPL: 20 (ref 12–20)
CA-I BLD-MCNC: 1.22 MMOL/L (ref 1.12–1.32)
CA-I BLD-MCNC: 8.7 MG/DL (ref 8.5–10.1)
CHLORIDE BLD-SCNC: 100 MMOL/L (ref 98–107)
CHLORIDE SERPL-SCNC: 105 MMOL/L (ref 97–108)
CO2 BLD-SCNC: 31 MMOL/L
CO2 SERPL-SCNC: 26 MMOL/L (ref 21–32)
COLOR UR: ABNORMAL
CREAT SERPL-MCNC: 0.85 MG/DL (ref 0.55–1.02)
CREAT UR-MCNC: 0.76 MG/DL (ref 0.6–1.3)
DIFFERENTIAL METHOD BLD: ABNORMAL
EKG ATRIAL RATE: 107 BPM
EKG DIAGNOSIS: NORMAL
EKG P AXIS: 81 DEGREES
EKG P-R INTERVAL: 178 MS
EKG Q-T INTERVAL: 326 MS
EKG QRS DURATION: 82 MS
EKG QTC CALCULATION (BAZETT): 435 MS
EKG R AXIS: 42 DEGREES
EKG T AXIS: 82 DEGREES
EKG VENTRICULAR RATE: 107 BPM
EOSINOPHIL # BLD: 0 K/UL (ref 0–0.4)
EOSINOPHIL NFR BLD: 0 % (ref 0–7)
EPITH CASTS URNS QL MICRO: ABNORMAL /LPF
ERYTHROCYTE [DISTWIDTH] IN BLOOD BY AUTOMATED COUNT: 15.7 % (ref 11.5–14.5)
FLUAV RNA SPEC QL NAA+PROBE: DETECTED
FLUBV RNA SPEC QL NAA+PROBE: NOT DETECTED
GLOBULIN SER CALC-MCNC: 3.3 G/DL (ref 2–4)
GLUCOSE BLD STRIP.AUTO-MCNC: 120 MG/DL (ref 65–100)
GLUCOSE BLD STRIP.AUTO-MCNC: 123 MG/DL (ref 65–100)
GLUCOSE SERPL-MCNC: 104 MG/DL (ref 65–100)
GLUCOSE UR STRIP.AUTO-MCNC: NEGATIVE MG/DL
HCO3 BLD-SCNC: 31.5 MMOL/L (ref 19–28)
HCT VFR BLD AUTO: 35.1 % (ref 35–47)
HGB BLD-MCNC: 10.9 G/DL (ref 11.5–16)
HGB UR QL STRIP: ABNORMAL
IMM GRANULOCYTES # BLD AUTO: 0.05 K/UL (ref 0–0.04)
IMM GRANULOCYTES NFR BLD AUTO: 0.4 % (ref 0–0.5)
KETONES UR QL STRIP.AUTO: NEGATIVE MG/DL
LACTATE BLD-SCNC: 0.96 MMOL/L (ref 0.4–2)
LEUKOCYTE ESTERASE UR QL STRIP.AUTO: NEGATIVE
LYMPHOCYTES # BLD: 0.59 K/UL (ref 0.8–3.5)
LYMPHOCYTES NFR BLD: 4.9 % (ref 12–49)
MCH RBC QN AUTO: 31 PG (ref 26–34)
MCHC RBC AUTO-ENTMCNC: 31.1 G/DL (ref 30–36.5)
MCV RBC AUTO: 99.7 FL (ref 80–99)
MONOCYTES # BLD: 0.39 K/UL (ref 0–1)
MONOCYTES NFR BLD: 3.3 % (ref 5–13)
MUCOUS THREADS URNS QL MICRO: ABNORMAL /LPF
NEUTS SEG # BLD: 10.94 K/UL (ref 1.8–8)
NEUTS SEG NFR BLD: 91.3 % (ref 32–75)
NITRITE UR QL STRIP.AUTO: NEGATIVE
NRBC # BLD: 0 K/UL (ref 0–0.01)
NRBC BLD-RTO: 0 PER 100 WBC
PCO2 BLD: 50.3 MMHG (ref 35–45)
PERFORMED BY:: ABNORMAL
PERFORMED BY:: ABNORMAL
PH BLD: 7.4 (ref 7.35–7.45)
PH UR STRIP: 5 (ref 5–8)
PLATELET # BLD AUTO: 154 K/UL (ref 150–400)
PMV BLD AUTO: 10.1 FL (ref 8.9–12.9)
PO2 BLD: <27 MMHG (ref 75–100)
POTASSIUM BLD-SCNC: 3.4 MMOL/L (ref 3.5–5.5)
POTASSIUM SERPL-SCNC: 3.5 MMOL/L (ref 3.5–5.1)
PROCALCITONIN SERPL-MCNC: 0.23 NG/ML
PROT SERPL-MCNC: 6.8 G/DL (ref 6.4–8.2)
PROT UR STRIP-MCNC: NEGATIVE MG/DL
RBC # BLD AUTO: 3.52 M/UL (ref 3.8–5.2)
RBC #/AREA URNS HPF: ABNORMAL /HPF (ref 0–5)
SARS-COV-2 RNA RESP QL NAA+PROBE: NOT DETECTED
SODIUM BLD-SCNC: 143 MMOL/L (ref 136–145)
SODIUM SERPL-SCNC: 139 MMOL/L (ref 136–145)
SP GR UR REFRACTOMETRY: >1.03 (ref 1–1.03)
SPECIMEN SITE: ABNORMAL
TROPONIN I SERPL HS-MCNC: 19 NG/L (ref 0–51)
URINE CULTURE IF INDICATED: ABNORMAL
UROBILINOGEN UR QL STRIP.AUTO: 0.1 EU/DL (ref 0.1–1)
WBC # BLD AUTO: 12 K/UL (ref 3.6–11)
WBC URNS QL MICRO: ABNORMAL /HPF (ref 0–4)

## 2025-01-15 PROCEDURE — 6360000002 HC RX W HCPCS: Performed by: INTERNAL MEDICINE

## 2025-01-15 PROCEDURE — 99285 EMERGENCY DEPT VISIT HI MDM: CPT

## 2025-01-15 PROCEDURE — 87040 BLOOD CULTURE FOR BACTERIA: CPT

## 2025-01-15 PROCEDURE — 83880 ASSAY OF NATRIURETIC PEPTIDE: CPT

## 2025-01-15 PROCEDURE — 84484 ASSAY OF TROPONIN QUANT: CPT

## 2025-01-15 PROCEDURE — 94640 AIRWAY INHALATION TREATMENT: CPT

## 2025-01-15 PROCEDURE — 6370000000 HC RX 637 (ALT 250 FOR IP): Performed by: INTERNAL MEDICINE

## 2025-01-15 PROCEDURE — 96375 TX/PRO/DX INJ NEW DRUG ADDON: CPT

## 2025-01-15 PROCEDURE — 85025 COMPLETE CBC W/AUTO DIFF WBC: CPT

## 2025-01-15 PROCEDURE — 36415 COLL VENOUS BLD VENIPUNCTURE: CPT

## 2025-01-15 PROCEDURE — 2500000003 HC RX 250 WO HCPCS: Performed by: EMERGENCY MEDICINE

## 2025-01-15 PROCEDURE — 6360000004 HC RX CONTRAST MEDICATION: Performed by: EMERGENCY MEDICINE

## 2025-01-15 PROCEDURE — 81001 URINALYSIS AUTO W/SCOPE: CPT

## 2025-01-15 PROCEDURE — 6360000002 HC RX W HCPCS: Performed by: EMERGENCY MEDICINE

## 2025-01-15 PROCEDURE — 2580000003 HC RX 258: Performed by: EMERGENCY MEDICINE

## 2025-01-15 PROCEDURE — 82962 GLUCOSE BLOOD TEST: CPT

## 2025-01-15 PROCEDURE — 96374 THER/PROPH/DIAG INJ IV PUSH: CPT

## 2025-01-15 PROCEDURE — 84295 ASSAY OF SERUM SODIUM: CPT

## 2025-01-15 PROCEDURE — 83605 ASSAY OF LACTIC ACID: CPT

## 2025-01-15 PROCEDURE — 71045 X-RAY EXAM CHEST 1 VIEW: CPT

## 2025-01-15 PROCEDURE — 82803 BLOOD GASES ANY COMBINATION: CPT

## 2025-01-15 PROCEDURE — 82947 ASSAY GLUCOSE BLOOD QUANT: CPT

## 2025-01-15 PROCEDURE — 93005 ELECTROCARDIOGRAM TRACING: CPT | Performed by: EMERGENCY MEDICINE

## 2025-01-15 PROCEDURE — 82330 ASSAY OF CALCIUM: CPT

## 2025-01-15 PROCEDURE — 6370000000 HC RX 637 (ALT 250 FOR IP): Performed by: STUDENT IN AN ORGANIZED HEALTH CARE EDUCATION/TRAINING PROGRAM

## 2025-01-15 PROCEDURE — 6370000000 HC RX 637 (ALT 250 FOR IP): Performed by: EMERGENCY MEDICINE

## 2025-01-15 PROCEDURE — 84145 PROCALCITONIN (PCT): CPT

## 2025-01-15 PROCEDURE — 80053 COMPREHEN METABOLIC PANEL: CPT

## 2025-01-15 PROCEDURE — 2500000003 HC RX 250 WO HCPCS: Performed by: INTERNAL MEDICINE

## 2025-01-15 PROCEDURE — 1100000000 HC RM PRIVATE

## 2025-01-15 PROCEDURE — 84132 ASSAY OF SERUM POTASSIUM: CPT

## 2025-01-15 PROCEDURE — 74018 RADEX ABDOMEN 1 VIEW: CPT

## 2025-01-15 PROCEDURE — 96361 HYDRATE IV INFUSION ADD-ON: CPT

## 2025-01-15 PROCEDURE — 71275 CT ANGIOGRAPHY CHEST: CPT

## 2025-01-15 PROCEDURE — 87636 SARSCOV2 & INF A&B AMP PRB: CPT

## 2025-01-15 RX ORDER — IPRATROPIUM BROMIDE AND ALBUTEROL SULFATE 2.5; .5 MG/3ML; MG/3ML
1 SOLUTION RESPIRATORY (INHALATION)
Status: DISCONTINUED | OUTPATIENT
Start: 2025-01-15 | End: 2025-01-16 | Stop reason: SDUPTHER

## 2025-01-15 RX ORDER — ALLOPURINOL 300 MG/1
150 TABLET ORAL DAILY
Status: DISCONTINUED | OUTPATIENT
Start: 2025-01-16 | End: 2025-01-18 | Stop reason: HOSPADM

## 2025-01-15 RX ORDER — 0.9 % SODIUM CHLORIDE 0.9 %
2000 INTRAVENOUS SOLUTION INTRAVENOUS ONCE
Status: COMPLETED | OUTPATIENT
Start: 2025-01-15 | End: 2025-01-15

## 2025-01-15 RX ORDER — PANTOPRAZOLE SODIUM 40 MG/1
40 TABLET, DELAYED RELEASE ORAL DAILY
Status: DISCONTINUED | OUTPATIENT
Start: 2025-01-16 | End: 2025-01-18 | Stop reason: HOSPADM

## 2025-01-15 RX ORDER — MORPHINE SULFATE 2 MG/ML
2 INJECTION, SOLUTION INTRAMUSCULAR; INTRAVENOUS
Status: COMPLETED | OUTPATIENT
Start: 2025-01-15 | End: 2025-01-15

## 2025-01-15 RX ORDER — OSELTAMIVIR PHOSPHATE 75 MG/1
75 CAPSULE ORAL ONCE
Status: COMPLETED | OUTPATIENT
Start: 2025-01-15 | End: 2025-01-15

## 2025-01-15 RX ORDER — SODIUM CHLORIDE 9 MG/ML
INJECTION, SOLUTION INTRAVENOUS PRN
Status: DISCONTINUED | OUTPATIENT
Start: 2025-01-15 | End: 2025-01-18 | Stop reason: HOSPADM

## 2025-01-15 RX ORDER — FUROSEMIDE 40 MG/1
40 TABLET ORAL DAILY
Status: DISCONTINUED | OUTPATIENT
Start: 2025-01-16 | End: 2025-01-18 | Stop reason: HOSPADM

## 2025-01-15 RX ORDER — AZELASTINE HYDROCHLORIDE 0.5 MG/ML
1 SOLUTION/ DROPS OPHTHALMIC 2 TIMES DAILY
Status: DISCONTINUED | OUTPATIENT
Start: 2025-01-15 | End: 2025-01-18 | Stop reason: HOSPADM

## 2025-01-15 RX ORDER — SODIUM CHLORIDE 0.9 % (FLUSH) 0.9 %
5-40 SYRINGE (ML) INJECTION PRN
Status: DISCONTINUED | OUTPATIENT
Start: 2025-01-15 | End: 2025-01-18 | Stop reason: HOSPADM

## 2025-01-15 RX ORDER — IOPAMIDOL 755 MG/ML
100 INJECTION, SOLUTION INTRAVASCULAR
Status: COMPLETED | OUTPATIENT
Start: 2025-01-15 | End: 2025-01-15

## 2025-01-15 RX ORDER — ASPIRIN 81 MG/1
81 TABLET, CHEWABLE ORAL 2 TIMES DAILY
Status: DISCONTINUED | OUTPATIENT
Start: 2025-01-15 | End: 2025-01-18 | Stop reason: HOSPADM

## 2025-01-15 RX ORDER — FAMOTIDINE 20 MG/1
20 TABLET, FILM COATED ORAL NIGHTLY
Status: DISCONTINUED | OUTPATIENT
Start: 2025-01-15 | End: 2025-01-15

## 2025-01-15 RX ORDER — MAGNESIUM SULFATE IN WATER 40 MG/ML
2000 INJECTION, SOLUTION INTRAVENOUS PRN
Status: DISCONTINUED | OUTPATIENT
Start: 2025-01-15 | End: 2025-01-18 | Stop reason: HOSPADM

## 2025-01-15 RX ORDER — ONDANSETRON 2 MG/ML
4 INJECTION INTRAMUSCULAR; INTRAVENOUS ONCE
Status: COMPLETED | OUTPATIENT
Start: 2025-01-15 | End: 2025-01-15

## 2025-01-15 RX ORDER — PREDNISONE 5 MG/1
10 TABLET ORAL
Status: DISCONTINUED | OUTPATIENT
Start: 2025-01-16 | End: 2025-01-16

## 2025-01-15 RX ORDER — POTASSIUM CHLORIDE 7.45 MG/ML
10 INJECTION INTRAVENOUS PRN
Status: DISCONTINUED | OUTPATIENT
Start: 2025-01-15 | End: 2025-01-18 | Stop reason: HOSPADM

## 2025-01-15 RX ORDER — CEFTRIAXONE 1 G/1
INJECTION, POWDER, FOR SOLUTION INTRAMUSCULAR; INTRAVENOUS
Status: DISPENSED
Start: 2025-01-15 | End: 2025-01-15

## 2025-01-15 RX ORDER — ACETAMINOPHEN 650 MG/1
650 SUPPOSITORY RECTAL EVERY 6 HOURS PRN
Status: DISCONTINUED | OUTPATIENT
Start: 2025-01-15 | End: 2025-01-18 | Stop reason: HOSPADM

## 2025-01-15 RX ORDER — ROPINIROLE 0.5 MG/1
0.5 TABLET, FILM COATED ORAL 2 TIMES DAILY
COMMUNITY

## 2025-01-15 RX ORDER — ONDANSETRON 2 MG/ML
4 INJECTION INTRAMUSCULAR; INTRAVENOUS EVERY 6 HOURS PRN
Status: DISCONTINUED | OUTPATIENT
Start: 2025-01-15 | End: 2025-01-18 | Stop reason: HOSPADM

## 2025-01-15 RX ORDER — ACETAMINOPHEN 325 MG/1
650 TABLET ORAL EVERY 6 HOURS PRN
Status: DISCONTINUED | OUTPATIENT
Start: 2025-01-15 | End: 2025-01-18 | Stop reason: HOSPADM

## 2025-01-15 RX ORDER — BUDESONIDE 0.5 MG/2ML
0.5 INHALANT ORAL
Status: DISCONTINUED | OUTPATIENT
Start: 2025-01-15 | End: 2025-01-18 | Stop reason: HOSPADM

## 2025-01-15 RX ORDER — ENOXAPARIN SODIUM 100 MG/ML
40 INJECTION SUBCUTANEOUS DAILY
Status: DISCONTINUED | OUTPATIENT
Start: 2025-01-15 | End: 2025-01-18 | Stop reason: HOSPADM

## 2025-01-15 RX ORDER — CYANOCOBALAMIN (VITAMIN B-12) 2500 MCG
1 TABLET, SUBLINGUAL SUBLINGUAL EVERY EVENING
Status: DISCONTINUED | OUTPATIENT
Start: 2025-01-15 | End: 2025-01-15

## 2025-01-15 RX ORDER — ACETAMINOPHEN 500 MG
1000 TABLET ORAL
Status: COMPLETED | OUTPATIENT
Start: 2025-01-15 | End: 2025-01-15

## 2025-01-15 RX ORDER — SODIUM CHLORIDE 0.9 % (FLUSH) 0.9 %
5-40 SYRINGE (ML) INJECTION EVERY 12 HOURS SCHEDULED
Status: DISCONTINUED | OUTPATIENT
Start: 2025-01-15 | End: 2025-01-18 | Stop reason: HOSPADM

## 2025-01-15 RX ORDER — MONTELUKAST SODIUM 10 MG/1
10 TABLET ORAL DAILY
Status: DISCONTINUED | OUTPATIENT
Start: 2025-01-16 | End: 2025-01-18 | Stop reason: HOSPADM

## 2025-01-15 RX ORDER — POTASSIUM CHLORIDE 1500 MG/1
40 TABLET, EXTENDED RELEASE ORAL PRN
Status: DISCONTINUED | OUTPATIENT
Start: 2025-01-15 | End: 2025-01-18 | Stop reason: HOSPADM

## 2025-01-15 RX ORDER — GABAPENTIN 100 MG/1
100 CAPSULE ORAL DAILY
Status: DISCONTINUED | OUTPATIENT
Start: 2025-01-16 | End: 2025-01-18 | Stop reason: HOSPADM

## 2025-01-15 RX ORDER — OSELTAMIVIR PHOSPHATE 30 MG/1
30 CAPSULE ORAL 2 TIMES DAILY
Status: DISCONTINUED | OUTPATIENT
Start: 2025-01-15 | End: 2025-01-18 | Stop reason: HOSPADM

## 2025-01-15 RX ORDER — POTASSIUM CHLORIDE 1500 MG/1
20 TABLET, EXTENDED RELEASE ORAL 2 TIMES DAILY
Status: DISCONTINUED | OUTPATIENT
Start: 2025-01-15 | End: 2025-01-18 | Stop reason: HOSPADM

## 2025-01-15 RX ORDER — ONDANSETRON 4 MG/1
4 TABLET, ORALLY DISINTEGRATING ORAL EVERY 8 HOURS PRN
Status: DISCONTINUED | OUTPATIENT
Start: 2025-01-15 | End: 2025-01-18 | Stop reason: HOSPADM

## 2025-01-15 RX ORDER — TRAZODONE HYDROCHLORIDE 150 MG/1
150 TABLET ORAL NIGHTLY PRN
Status: DISCONTINUED | OUTPATIENT
Start: 2025-01-15 | End: 2025-01-18 | Stop reason: HOSPADM

## 2025-01-15 RX ORDER — ALBUTEROL SULFATE 2.5 MG/.5ML
2.5 SOLUTION RESPIRATORY (INHALATION) EVERY 4 HOURS PRN
Status: DISCONTINUED | OUTPATIENT
Start: 2025-01-15 | End: 2025-01-18 | Stop reason: HOSPADM

## 2025-01-15 RX ORDER — ROPINIROLE 0.25 MG/1
0.5 TABLET, FILM COATED ORAL EVERY MORNING
Status: DISCONTINUED | OUTPATIENT
Start: 2025-01-16 | End: 2025-01-18 | Stop reason: HOSPADM

## 2025-01-15 RX ORDER — POLYETHYLENE GLYCOL 3350 17 G/17G
17 POWDER, FOR SOLUTION ORAL DAILY PRN
Status: DISCONTINUED | OUTPATIENT
Start: 2025-01-15 | End: 2025-01-18 | Stop reason: HOSPADM

## 2025-01-15 RX ORDER — LOSARTAN POTASSIUM AND HYDROCHLOROTHIAZIDE 12.5; 1 MG/1; MG/1
1 TABLET ORAL DAILY
Status: DISCONTINUED | OUTPATIENT
Start: 2025-01-16 | End: 2025-01-16 | Stop reason: SDUPTHER

## 2025-01-15 RX ORDER — FLUTICASONE PROPIONATE 50 MCG
2 SPRAY, SUSPENSION (ML) NASAL 2 TIMES DAILY
Status: DISCONTINUED | OUTPATIENT
Start: 2025-01-15 | End: 2025-01-18 | Stop reason: HOSPADM

## 2025-01-15 RX ADMIN — CEFTRIAXONE SODIUM 1000 MG: 1 INJECTION, POWDER, FOR SOLUTION INTRAMUSCULAR; INTRAVENOUS at 11:47

## 2025-01-15 RX ADMIN — SODIUM CHLORIDE, PRESERVATIVE FREE 10 ML: 5 INJECTION INTRAVENOUS at 20:50

## 2025-01-15 RX ADMIN — IPRATROPIUM BROMIDE AND ALBUTEROL SULFATE 1 DOSE: 2.5; .5 SOLUTION RESPIRATORY (INHALATION) at 20:31

## 2025-01-15 RX ADMIN — OSELTAMIVIR PHOSPHATE 30 MG: 30 CAPSULE ORAL at 20:49

## 2025-01-15 RX ADMIN — OSELTAMIVIR PHOSPHATE 75 MG: 75 CAPSULE ORAL at 15:10

## 2025-01-15 RX ADMIN — BUDESONIDE INHALATION 500 MCG: 0.5 SUSPENSION RESPIRATORY (INHALATION) at 20:31

## 2025-01-15 RX ADMIN — ONDANSETRON 4 MG: 2 INJECTION INTRAMUSCULAR; INTRAVENOUS at 11:47

## 2025-01-15 RX ADMIN — ENOXAPARIN SODIUM 40 MG: 100 INJECTION SUBCUTANEOUS at 20:51

## 2025-01-15 RX ADMIN — ASPIRIN 81 MG CHEWABLE TABLET 81 MG: 81 TABLET CHEWABLE at 22:36

## 2025-01-15 RX ADMIN — ROPINIROLE HYDROCHLORIDE 1.5 MG: 1 TABLET, FILM COATED ORAL at 22:38

## 2025-01-15 RX ADMIN — SODIUM CHLORIDE 2000 ML: 9 INJECTION, SOLUTION INTRAVENOUS at 16:51

## 2025-01-15 RX ADMIN — TRAZODONE HYDROCHLORIDE 150 MG: 150 TABLET ORAL at 22:36

## 2025-01-15 RX ADMIN — ACETAMINOPHEN 650 MG: 325 TABLET ORAL at 20:49

## 2025-01-15 RX ADMIN — MORPHINE SULFATE 2 MG: 2 INJECTION, SOLUTION INTRAMUSCULAR; INTRAVENOUS at 12:02

## 2025-01-15 RX ADMIN — ACETAMINOPHEN 1000 MG: 500 TABLET, FILM COATED ORAL at 11:46

## 2025-01-15 RX ADMIN — POTASSIUM CHLORIDE 20 MEQ: 1500 TABLET, EXTENDED RELEASE ORAL at 22:36

## 2025-01-15 RX ADMIN — IOPAMIDOL 100 ML: 755 INJECTION, SOLUTION INTRAVENOUS at 16:08

## 2025-01-15 RX ADMIN — SODIUM CHLORIDE 2000 ML: 9 INJECTION, SOLUTION INTRAVENOUS at 11:43

## 2025-01-15 RX ADMIN — FLUTICASONE PROPIONATE 2 SPRAY: 50 SPRAY, METERED NASAL at 22:34

## 2025-01-15 RX ADMIN — POLYETHYLENE GLYCOL 3350 17 G: 17 POWDER, FOR SOLUTION ORAL at 20:50

## 2025-01-15 ASSESSMENT — PAIN DESCRIPTION - LOCATION
LOCATION: ABDOMEN
LOCATION: ABDOMEN

## 2025-01-15 ASSESSMENT — PAIN - FUNCTIONAL ASSESSMENT
PAIN_FUNCTIONAL_ASSESSMENT: 0-10
PAIN_FUNCTIONAL_ASSESSMENT: PREVENTS OR INTERFERES SOME ACTIVE ACTIVITIES AND ADLS

## 2025-01-15 ASSESSMENT — PAIN SCALES - GENERAL
PAINLEVEL_OUTOF10: 6
PAINLEVEL_OUTOF10: 8
PAINLEVEL_OUTOF10: 0
PAINLEVEL_OUTOF10: 9

## 2025-01-15 ASSESSMENT — PAIN DESCRIPTION - DESCRIPTORS
DESCRIPTORS: ACHING
DESCRIPTORS: ACHING;DISCOMFORT

## 2025-01-15 ASSESSMENT — PAIN DESCRIPTION - ORIENTATION
ORIENTATION: ANTERIOR
ORIENTATION: MID

## 2025-01-15 NOTE — ED NOTES
Pt placed on continuous cardiac monitoring, pulse ox, and blood pressure monitoring at this time.     Pt moved to Rm 18 for monitoring and oxygen

## 2025-01-15 NOTE — ED NOTES
ED TO INPATIENT SBAR HANDOFF    Patient Name: Sue Alberto   Preferred Name: Sue MULLINSB: 1940  85 y.o.   Family/Caregiver Present: no   Code Status Order: No Order  PO Status: NPO:No  Telemetry Order:   C-SSRS: Risk of Suicide: No Risk  Sitter no     Restraints:     Sepsis Risk Score      Situation  Chief Complaint   Patient presents with    Tachycardia    Abdominal Pain     Brief Description of Patient's Condition: Client having abdominal pressure with fatigue. Sepsis protocol with negative troponin. Postive for influenza A.   Mental Status: oriented  Arrived from:Home  Imaging:   CTA CHEST W WO CONTRAST PE Eval   Final Result   1. No PE. No acute process.   2. Mildly enlarged mediastinal lymph nodes.   3. Minimal emphysema.      Electronically signed by Kush Fenton      XR CHEST PORTABLE   Final Result      No acute process on portable chest.         Electronically signed by Girma Palafox      XR ABDOMEN (KUB) (SINGLE AP VIEW)   Final Result   Nonobstructive bowel gas pattern.      Electronically signed by Wilian Valdez        Abnormal labs:   Abnormal Labs Reviewed   COVID-19 & INFLUENZA COMBO - Abnormal; Notable for the following components:       Result Value    Rapid Influenza A By PCR DETECTED (*)     All other components within normal limits   CBC WITH AUTO DIFFERENTIAL - Abnormal; Notable for the following components:    WBC 12.0 (*)     RBC 3.52 (*)     Hemoglobin 10.9 (*)     MCV 99.7 (*)     RDW 15.7 (*)     Neutrophils % 91.3 (*)     Lymphocytes % 4.9 (*)     Monocytes % 3.3 (*)     Neutrophils Absolute 10.94 (*)     Lymphocytes Absolute 0.59 (*)     Immature Granulocytes Absolute 0.05 (*)     All other components within normal limits   COMPREHENSIVE METABOLIC PANEL - Abnormal; Notable for the following components:    Glucose 104 (*)     All other components within normal limits   PROCALCITONIN - Abnormal; Notable for the following components:    Procalcitonin 0.23 (*)     All other components  Score: NIH     Active LDA's:   Peripheral IV 01/15/25 Left;Posterior Hand (Active)       Peripheral IV 01/15/25 Left;Proximal;Anterior Forearm (Active)     Active Central Lines:                          Active Wounds:    Active Mcnally's:    Active Feeding Tubes:      Administered Medications:   Medications   cefTRIAXone (ROCEPHIN) 1 g injection (  Canceled Entry 1/15/25 1148)   oseltamivir (TAMIFLU) capsule 30 mg (has no administration in time range)   sodium chloride 0.9 % bolus 2,000 mL (0 mLs IntraVENous Stopped 1/15/25 1246)   cefTRIAXone (ROCEPHIN) 1,000 mg in sterile water 10 mL IV syringe (1,000 mg IntraVENous Given 1/15/25 1147)   ondansetron (ZOFRAN) injection 4 mg (4 mg IntraVENous Given 1/15/25 1147)   morphine (PF) injection 2 mg (2 mg IntraVENous Given 1/15/25 1202)   acetaminophen (TYLENOL) tablet 1,000 mg (1,000 mg Oral Given 1/15/25 1146)   oseltamivir (TAMIFLU) capsule 75 mg (75 mg Oral Given 1/15/25 1510)   iopamidol (ISOVUE-370) 76 % injection 100 mL (100 mLs IntraVENous Given 1/15/25 1608)   sodium chloride 0.9 % bolus 2,000 mL (2,000 mLs IntraVENous New Bag 1/15/25 1651)     Last documented pain medication administration:    Pertinent or High Risk Medications/Drips: no   If Yes, please provide details:    Blood Product Administration: no  If Yes, please provide details:    Process Protocols/Bundles: Sepsis Protocol/Bundle Completion-     Recommendation  Incomplete STAT orders:    Overdue Medications:    Patient Belongings:    Additional Comments:    If any further questions, please call Sending RN at [phone]       Admitting Unit Notification  Name of person notified and time:   Mallory Sanchez      Electronically signed by: Electronically signed by Anabelle Be RN on 1/15/2025 at 6:18 PM

## 2025-01-15 NOTE — PROGRESS NOTES
Pt arrived to floor. Oriented to room. Ambulated with pt to bathroom. Pt currently on O2 @ 2 lpm.  at bedside when arrived to room. Pt A/O x4. Cough noted. No resp distress at this time. Call bell within reach.

## 2025-01-15 NOTE — ED TRIAGE NOTES
Pt complaining of abdominal pain and constipation, reports that she used the bathroom last night.  PT found to be tachycardic, hypoxic, and febrile. Sepsis Alert called from field. Reports has recently gotten over covid.     Sepsis Alert Called

## 2025-01-15 NOTE — ED PROVIDER NOTES
such as CT, Ultrasound and MRI are read by the radiologist. Plain radiographic images are visualized and preliminarily interpreted by the ED Provider with the following findings: See ED Course Below    Interpretation per the Radiologist below, if available at the time of this note:  CTA CHEST W WO CONTRAST PE Eval   Final Result   1. No PE. No acute process.   2. Mildly enlarged mediastinal lymph nodes.   3. Minimal emphysema.      Electronically signed by Kush Fenton      XR CHEST PORTABLE   Final Result      No acute process on portable chest.         Electronically signed by Girma Palafox      XR ABDOMEN (KUB) (SINGLE AP VIEW)   Final Result   Nonobstructive bowel gas pattern.      Electronically signed by Wilian Valdez           ED COURSE and DIFFERENTIAL DIAGNOSIS/MDM   4:38 PM Differential and Considerations: 85-year-old presents with tachycardia vague abdominal pain cough congestion weakness.  Thought she had the flu.    Records Reviewed (source and summary of external notes): Prior medical records and Nursing notes.    Vitals:    Vitals:    01/15/25 1503 01/15/25 1518 01/15/25 1533 01/15/25 1633   BP: (!) 144/70 130/63 (!) 139/97    Pulse: 93 89 88 90   Resp:       Temp:       TempSrc:       SpO2: 96% 97% 97% 98%   Weight:       Height:            ED COURSE  ED Course as of 01/15/25 1638   Wed Twan 15, 2025   1206 Sepsis alert.  Sepsis protocol initiated with empiric antibiotics antipyretics fluids and antibiotics after blood culture [HP]   1206 EKG at 12:00.  Sinus tachycardia rate of 107 no ST changes no T wave inversions normal intervals.  Resolved ACS.  Interpreted independently by ER physician. [HP]   1320 Rapid Influenza A By PCR(!): DETECTED  Influenza A positive [HP]   1321 Procalcitonin(!): 0.23  Low suspicion of acute bacterial sepsis [HP]   1321 WBC(!): 12.0  Leukocytosis [HP]   1321 Hemoglobin Quant(!): 10.9  Anemia [HP]   1321 Comprehensive Metabolic Panel(!):    Sodium 139   Potassium 3.5  401-31). There  is a small hiatal hernia. Hepatic fissural widening is likely senescent rather  than cirrhotic. Post cholecystectomy. The visualized upper abdomen is otherwise  normal.     IMPRESSION:  1. No PE. No acute process.  2. Mildly enlarged mediastinal lymph nodes.  3. Minimal emphysema.     Electronically signed by Kush Fenton   []      ED Course User Index  [HP] Mirian Renee MD       SEPSIS Reassessment: Patient does NOT meet Sepsis criteria after ED workup    Clinical Management Tools:  Not Applicable    Patient was given the following medications:  Medications   cefTRIAXone (ROCEPHIN) 1 g injection (  Canceled Entry 1/15/25 1148)   oseltamivir (TAMIFLU) capsule 30 mg (has no administration in time range)   sodium chloride 0.9 % bolus 2,000 mL (has no administration in time range)   sodium chloride 0.9 % bolus 2,000 mL (0 mLs IntraVENous Stopped 1/15/25 1246)   cefTRIAXone (ROCEPHIN) 1,000 mg in sterile water 10 mL IV syringe (1,000 mg IntraVENous Given 1/15/25 1147)   ondansetron (ZOFRAN) injection 4 mg (4 mg IntraVENous Given 1/15/25 1147)   morphine (PF) injection 2 mg (2 mg IntraVENous Given 1/15/25 1202)   acetaminophen (TYLENOL) tablet 1,000 mg (1,000 mg Oral Given 1/15/25 1146)   oseltamivir (TAMIFLU) capsule 75 mg (75 mg Oral Given 1/15/25 1510)   iopamidol (ISOVUE-370) 76 % injection 100 mL (100 mLs IntraVENous Given 1/15/25 1608)       CONSULTS: See ED Course/MDM for further details.  None     Social Determinants affecting Diagnosis/Treatment: None    Smoking Cessation: Not Applicable    PROCEDURES   Unless otherwise noted above, none  Procedures      CRITICAL CARE TIME   CRITICAL CARE NOTE :    4:46 PM    IMPENDING DETERIORATION -Airway, Respiratory, and Cardiovascular  ASSOCIATED RISK FACTORS - Metabolic changes  MANAGEMENT- Bedside Assessment    INTERPRETATION -  Xrays  INTERVENTIONS - respiratory mgmt  CASE REVIEW - Medical Sub-Specialist  TREATMENT RESPONSE -Stable  PERFORMED

## 2025-01-16 LAB
ANION GAP SERPL CALC-SCNC: 4 MMOL/L (ref 2–12)
BASOPHILS # BLD: 0.02 K/UL (ref 0–0.1)
BASOPHILS NFR BLD: 0.2 % (ref 0–1)
BUN SERPL-MCNC: 11 MG/DL (ref 6–20)
BUN/CREAT SERPL: 14 (ref 12–20)
CA-I BLD-MCNC: 8.3 MG/DL (ref 8.5–10.1)
CHLORIDE SERPL-SCNC: 108 MMOL/L (ref 97–108)
CO2 SERPL-SCNC: 28 MMOL/L (ref 21–32)
CREAT SERPL-MCNC: 0.76 MG/DL (ref 0.55–1.02)
DIFFERENTIAL METHOD BLD: ABNORMAL
EOSINOPHIL # BLD: 0.02 K/UL (ref 0–0.4)
EOSINOPHIL NFR BLD: 0.2 % (ref 0–7)
ERYTHROCYTE [DISTWIDTH] IN BLOOD BY AUTOMATED COUNT: 15.7 % (ref 11.5–14.5)
GLUCOSE BLD STRIP.AUTO-MCNC: 223 MG/DL (ref 65–100)
GLUCOSE SERPL-MCNC: 87 MG/DL (ref 65–100)
HCT VFR BLD AUTO: 31 % (ref 35–47)
HGB BLD-MCNC: 9.4 G/DL (ref 11.5–16)
IMM GRANULOCYTES # BLD AUTO: 0.05 K/UL (ref 0–0.04)
IMM GRANULOCYTES NFR BLD AUTO: 0.5 % (ref 0–0.5)
LYMPHOCYTES # BLD: 1 K/UL (ref 0.8–3.5)
LYMPHOCYTES NFR BLD: 9.5 % (ref 12–49)
MCH RBC QN AUTO: 31 PG (ref 26–34)
MCHC RBC AUTO-ENTMCNC: 30.3 G/DL (ref 30–36.5)
MCV RBC AUTO: 102.3 FL (ref 80–99)
MONOCYTES # BLD: 0.48 K/UL (ref 0–1)
MONOCYTES NFR BLD: 4.6 % (ref 5–13)
NEUTS SEG # BLD: 8.96 K/UL (ref 1.8–8)
NEUTS SEG NFR BLD: 85 % (ref 32–75)
NRBC # BLD: 0 K/UL (ref 0–0.01)
NRBC BLD-RTO: 0 PER 100 WBC
PERFORMED BY:: ABNORMAL
PLATELET # BLD AUTO: 137 K/UL (ref 150–400)
PMV BLD AUTO: 10.7 FL (ref 8.9–12.9)
POTASSIUM SERPL-SCNC: 3.2 MMOL/L (ref 3.5–5.1)
RBC # BLD AUTO: 3.03 M/UL (ref 3.8–5.2)
SODIUM SERPL-SCNC: 140 MMOL/L (ref 136–145)
WBC # BLD AUTO: 10.5 K/UL (ref 3.6–11)

## 2025-01-16 PROCEDURE — 6370000000 HC RX 637 (ALT 250 FOR IP)

## 2025-01-16 PROCEDURE — 94640 AIRWAY INHALATION TREATMENT: CPT

## 2025-01-16 PROCEDURE — 85025 COMPLETE CBC W/AUTO DIFF WBC: CPT

## 2025-01-16 PROCEDURE — 1100000000 HC RM PRIVATE

## 2025-01-16 PROCEDURE — 94761 N-INVAS EAR/PLS OXIMETRY MLT: CPT

## 2025-01-16 PROCEDURE — 6360000002 HC RX W HCPCS: Performed by: INTERNAL MEDICINE

## 2025-01-16 PROCEDURE — 80048 BASIC METABOLIC PNL TOTAL CA: CPT

## 2025-01-16 PROCEDURE — 94667 MNPJ CHEST WALL 1ST: CPT

## 2025-01-16 PROCEDURE — 2500000003 HC RX 250 WO HCPCS: Performed by: INTERNAL MEDICINE

## 2025-01-16 PROCEDURE — 82962 GLUCOSE BLOOD TEST: CPT

## 2025-01-16 PROCEDURE — 6370000000 HC RX 637 (ALT 250 FOR IP): Performed by: EMERGENCY MEDICINE

## 2025-01-16 PROCEDURE — 6370000000 HC RX 637 (ALT 250 FOR IP): Performed by: INTERNAL MEDICINE

## 2025-01-16 PROCEDURE — 97161 PT EVAL LOW COMPLEX 20 MIN: CPT

## 2025-01-16 PROCEDURE — 2700000000 HC OXYGEN THERAPY PER DAY

## 2025-01-16 PROCEDURE — 36415 COLL VENOUS BLD VENIPUNCTURE: CPT

## 2025-01-16 PROCEDURE — 6370000000 HC RX 637 (ALT 250 FOR IP): Performed by: STUDENT IN AN ORGANIZED HEALTH CARE EDUCATION/TRAINING PROGRAM

## 2025-01-16 PROCEDURE — 97530 THERAPEUTIC ACTIVITIES: CPT

## 2025-01-16 PROCEDURE — 94668 MNPJ CHEST WALL SBSQ: CPT

## 2025-01-16 RX ORDER — LOSARTAN POTASSIUM 50 MG/1
100 TABLET ORAL DAILY
Status: DISCONTINUED | OUTPATIENT
Start: 2025-01-16 | End: 2025-01-18 | Stop reason: HOSPADM

## 2025-01-16 RX ORDER — IPRATROPIUM BROMIDE AND ALBUTEROL SULFATE 2.5; .5 MG/3ML; MG/3ML
1 SOLUTION RESPIRATORY (INHALATION)
Status: DISCONTINUED | OUTPATIENT
Start: 2025-01-16 | End: 2025-01-18 | Stop reason: HOSPADM

## 2025-01-16 RX ORDER — LOSARTAN POTASSIUM 50 MG/1
50 TABLET ORAL DAILY
COMMUNITY

## 2025-01-16 RX ORDER — METHYLPREDNISOLONE SODIUM SUCCINATE 40 MG/ML
40 INJECTION INTRAMUSCULAR; INTRAVENOUS EVERY 8 HOURS
Status: COMPLETED | OUTPATIENT
Start: 2025-01-16 | End: 2025-01-16

## 2025-01-16 RX ORDER — VENLAFAXINE HYDROCHLORIDE 150 MG/1
150 TABLET, EXTENDED RELEASE ORAL
COMMUNITY

## 2025-01-16 RX ORDER — LEVOFLOXACIN 5 MG/ML
500 INJECTION, SOLUTION INTRAVENOUS EVERY 24 HOURS
Status: COMPLETED | OUTPATIENT
Start: 2025-01-16 | End: 2025-01-16

## 2025-01-16 RX ORDER — LEVOFLOXACIN 5 MG/ML
250 INJECTION, SOLUTION INTRAVENOUS EVERY 24 HOURS
Status: DISCONTINUED | OUTPATIENT
Start: 2025-01-17 | End: 2025-01-17

## 2025-01-16 RX ADMIN — TRAZODONE HYDROCHLORIDE 150 MG: 150 TABLET ORAL at 21:55

## 2025-01-16 RX ADMIN — FLUTICASONE PROPIONATE 2 SPRAY: 50 SPRAY, METERED NASAL at 21:52

## 2025-01-16 RX ADMIN — ROPINIROLE HYDROCHLORIDE 1.5 MG: 1 TABLET, FILM COATED ORAL at 21:10

## 2025-01-16 RX ADMIN — POTASSIUM CHLORIDE 20 MEQ: 1500 TABLET, EXTENDED RELEASE ORAL at 09:27

## 2025-01-16 RX ADMIN — BUDESONIDE INHALATION 500 MCG: 0.5 SUSPENSION RESPIRATORY (INHALATION) at 20:36

## 2025-01-16 RX ADMIN — OSELTAMIVIR PHOSPHATE 30 MG: 30 CAPSULE ORAL at 09:27

## 2025-01-16 RX ADMIN — ALLOPURINOL 150 MG: 300 TABLET ORAL at 09:26

## 2025-01-16 RX ADMIN — POTASSIUM CHLORIDE 20 MEQ: 1500 TABLET, EXTENDED RELEASE ORAL at 21:51

## 2025-01-16 RX ADMIN — IPRATROPIUM BROMIDE AND ALBUTEROL SULFATE 1 DOSE: 2.5; .5 SOLUTION RESPIRATORY (INHALATION) at 07:37

## 2025-01-16 RX ADMIN — METHYLPREDNISOLONE SODIUM SUCCINATE 40 MG: 40 INJECTION INTRAMUSCULAR; INTRAVENOUS at 18:15

## 2025-01-16 RX ADMIN — IPRATROPIUM BROMIDE AND ALBUTEROL SULFATE 1 DOSE: 2.5; .5 SOLUTION RESPIRATORY (INHALATION) at 20:35

## 2025-01-16 RX ADMIN — MONTELUKAST 10 MG: 10 TABLET, FILM COATED ORAL at 09:26

## 2025-01-16 RX ADMIN — SALINE NASAL SPRAY 1 SPRAY: 1.5 SOLUTION NASAL at 18:24

## 2025-01-16 RX ADMIN — LOSARTAN POTASSIUM 100 MG: 50 TABLET, FILM COATED ORAL at 21:10

## 2025-01-16 RX ADMIN — ASPIRIN 81 MG CHEWABLE TABLET 81 MG: 81 TABLET CHEWABLE at 21:51

## 2025-01-16 RX ADMIN — BUDESONIDE INHALATION 500 MCG: 0.5 SUSPENSION RESPIRATORY (INHALATION) at 07:38

## 2025-01-16 RX ADMIN — IPRATROPIUM BROMIDE AND ALBUTEROL SULFATE 1 DOSE: 2.5; .5 SOLUTION RESPIRATORY (INHALATION) at 13:43

## 2025-01-16 RX ADMIN — METHYLPREDNISOLONE SODIUM SUCCINATE 40 MG: 40 INJECTION INTRAMUSCULAR; INTRAVENOUS at 09:26

## 2025-01-16 RX ADMIN — SODIUM CHLORIDE, PRESERVATIVE FREE 10 ML: 5 INJECTION INTRAVENOUS at 21:54

## 2025-01-16 RX ADMIN — PANTOPRAZOLE SODIUM 40 MG: 40 TABLET, DELAYED RELEASE ORAL at 09:27

## 2025-01-16 RX ADMIN — ASPIRIN 81 MG CHEWABLE TABLET 81 MG: 81 TABLET CHEWABLE at 09:27

## 2025-01-16 RX ADMIN — GABAPENTIN 100 MG: 100 CAPSULE ORAL at 09:27

## 2025-01-16 RX ADMIN — OSELTAMIVIR PHOSPHATE 30 MG: 30 CAPSULE ORAL at 21:10

## 2025-01-16 RX ADMIN — LEVOFLOXACIN 500 MG: 5 INJECTION, SOLUTION INTRAVENOUS at 09:36

## 2025-01-16 RX ADMIN — FUROSEMIDE 40 MG: 40 TABLET ORAL at 09:27

## 2025-01-16 RX ADMIN — FLUTICASONE PROPIONATE 2 SPRAY: 50 SPRAY, METERED NASAL at 09:28

## 2025-01-16 RX ADMIN — ROPINIROLE HYDROCHLORIDE 0.5 MG: 0.25 TABLET, FILM COATED ORAL at 09:26

## 2025-01-16 RX ADMIN — ENOXAPARIN SODIUM 40 MG: 100 INJECTION SUBCUTANEOUS at 09:37

## 2025-01-16 ASSESSMENT — PAIN SCALES - GENERAL
PAINLEVEL_OUTOF10: 0

## 2025-01-16 NOTE — PROGRESS NOTES
Pharmacy Note - Levofloxacin    500 mg IVPB daily x 5 days ordered for treatment of COPD Exacerbation. Per [facility] Renal Dosing Policy, Levofloxacin will be changed to 500 mg IVPB x 1 followed by 250 mg IVPB daily thereafter for 5 total doses.     Estimated Creatinine Clearance: Estimated Creatinine Clearance: 42 mL/min (based on SCr of 0.85 mg/dL).  Dialysis Status, EDD, CKD: n/a    BMI:  Body mass index is 29.29 kg/m².    Recent Labs     01/15/25  1129   WBC 12.0*     Temp (24hrs), Av.7 °F (37.1 °C), Min:97.9 °F (36.6 °C), Max:100.6 °F (38.1 °C)      Rationale for Adjustment:  Renal dosing is because drug is renally eliminated.CrCl ~ 42 ml/min.    Pharmacy will continue to monitor and adjust dose as necessary.      Please call inpatient pharmacy with any questions.    Thank you,  CHECO PERAZA, [facility] MS

## 2025-01-16 NOTE — CONSULTS
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 Time spent with pt and staff actively rendering care, managing pt and coordinating care as stated below; 55 minutes, exclusive of any procedures      RECOMMENDATIONS/PLAN:   85-year-old lady came in because of shortness of breath cough influenza A positive  Patient is on Tamiflu  Agree with Empiric IV antibiotics pending culture results sputum C&S Gram stain  Follow culture results patient on Levaquin  History of bronchiectasis will start patient on nebulizer treatment vest therapy  On prednisone will give 2 doses of IV Solu-Medrol  CT chest showed adenopathy no pulmonary embolism  On 2 L nasal Cannula oxygen as salvage oxygen delivery device to provide high concentration of oxygen to overcome refractory hypoxia;   Transfuse prn to maintain Hgb > 7  Labs to follow electrolytes, renal function and and blood counts  Bronchial hygiene with respiratory therapy techniques, bronchodilators  Pt needs IV fluids with additives and Drug therapy requiring intensive monitoring for toxicity  Prescription drug management with home med reconciliation reviewed  DVT, SUP prophylaxis       [x] High complexity decision making was performed  [x] See my orders for details  HPI  85-year-old lady came in because of shortness of breath cough and also complaining of constipation she has significant past medical history of asthma bronchiectasis and previous infection with MAC followed by [facility] she was recently seen twice by pulmonary because of cough upper respiratory symptoms now she came to the hospital she is flu positive she uses inhalers and  sodium chloride flush 0.9 % injection 5-40 mL  5-40 mL IntraVENous PRN Abelino Garcia MD        0.9 % sodium chloride infusion   IntraVENous PRN Abelino Garcia MD        potassium chloride (KLOR-CON M) extended release tablet 40 mEq  40 mEq Oral PRN Abelino Garcia MD        Or    potassium bicarb-citric acid (EFFER-K) effervescent tablet 40 mEq  40 mEq Oral PRN Abelino Garcia MD        Or    potassium chloride 10 mEq/100 mL IVPB (Peripheral Line)  10 mEq IntraVENous PRN Abelino Garcia MD        magnesium sulfate 2000 mg in 50 mL IVPB premix  2,000 mg IntraVENous PRN Abelino Garcia MD        enoxaparin (LOVENOX) injection 40 mg  40 mg SubCUTAneous Daily Abelino Garcia MD   40 mg at 01/15/25 2051    ondansetron (ZOFRAN-ODT) disintegrating tablet 4 mg  4 mg Oral Q8H PRN Abelino Garcia MD        Or    ondansetron (ZOFRAN) injection 4 mg  4 mg IntraVENous Q6H PRN Abelino Garcia MD        polyethylene glycol (GLYCOLAX) packet 17 g  17 g Oral Daily PRN Abelino Garcia MD   17 g at 01/15/25 2050    acetaminophen (TYLENOL) tablet 650 mg  650 mg Oral Q6H PRN Abelino Garcia MD   650 mg at 01/15/25 2049    Or    acetaminophen (TYLENOL) suppository 650 mg  650 mg Rectal Q6H PRN Abelino Garcia MD        budesonide (PULMICORT) nebulizer suspension 500 mcg  0.5 mg Nebulization BID RT Abelino Garcia MD   500 mcg at 01/16/25 0738    ipratropium 0.5 mg-albuterol 2.5 mg (DUONEB) nebulizer solution 1 Dose  1 Dose Inhalation 4x Daily RT Abelino Garcia MD   1 Dose at 01/16/25 0737    albuterol (PROVENTIL) nebulizer solution 2.5 mg  2.5 mg Nebulization Q4H PRN Abelino Garcia MD        fluticasone (FLONASE) 50 MCG/ACT nasal spray 2 spray  2 spray Nasal BID Nikolai Curtis PA-C   2 spray at 01/15/25 3204    allopurinol (ZYLOPRIM) tablet 150 mg  150 mg Oral Daily Abelino Garcia MD        aspirin chewable tablet 81 mg  81 mg Oral BID Jose,

## 2025-01-16 NOTE — PROGRESS NOTES
RRT Clinical Rounding Nurse Progress Report      SUBJECTIVE: Patient assessed secondary to elevated Deterioration Index Score.      Vitals:    01/16/25 0701 01/16/25 0738 01/16/25 0752 01/16/25 0846   BP:   115/70    Pulse: 89  93 (!) 194   Resp:   18    Temp:   97.9 °F (36.6 °C)    TempSrc:   Oral    SpO2:  96% 100%    Weight:       Height:            DETERIORATION INDEX SCORE: 51    ASSESSMENT:      PLAN:  Notified Jim FOY of Elevated DI Score.  She states she will reassess patient.    Zo Pelaez RN

## 2025-01-16 NOTE — CARE COORDINATION
01/16/25 1322   Service Assessment   Patient Orientation Alert and Oriented   Cognition Alert   History Provided By Patient   Primary Caregiver Self   Accompanied By/Relationship alone   Support Systems Spouse/Significant Other   Patient's Healthcare Decision Maker is: Legal Next of Kin   PCP Verified by CM Yes   Last Visit to PCP Within last 6 months   Prior Functional Level Independent in ADLs/IADLs   Current Functional Level Independent in ADLs/IADLs   Can patient return to prior living arrangement Yes   Ability to make needs known: Good   Family able to assist with home care needs: Yes   Would you like for me to discuss the discharge plan with any other family members/significant others, and if so, who? Yes  ()   Financial Resources Medicare   Community Resources None   Social/Functional History   Lives With Spouse   Type of Home House   Home Layout One level   Discharge Planning   Type of Residence House   Living Arrangements Alone;Spouse/Significant Other   Current Services Prior To Admission None   Potential Assistance Needed N/A   DME Ordered? No   Type of Home Care Services None   Patient expects to be discharged to: House   One/Two Story Residence One story   History of falls? 0   Services At/After Discharge   Transition of Care Consult (CM Consult) N/A   Services At/After Discharge None   Mode of Transport at Discharge Other (see comment)  (/family)   Confirm Follow Up Transport Self     CM assessment complete and demographics confirmed. Patient states that she lives at home in a single story home with her . She states that she is INDEP in her daily care. Patient does report a history of HH about 20 years ago post surgery. Nothing in more recent years.     Preferred pharmacy is [facility] [address] No interest in meds to beds at this time.     Advance Care Planning     General Advance Care Planning (ACP) Conversation    Date of Conversation: 1/16/2025  Conducted with: Patient with  Decision Making Capacity  Other persons present: None    Healthcare Decision Maker: No healthcare decision makers have been documented.       Content/Action Overview:  Has NO ACP documents-Information provided  Reviewed DNR/DNI and patient elects Full Code (Attempt Resuscitation)

## 2025-01-16 NOTE — PLAN OF CARE
Problem: Discharge Planning  Goal: Discharge to home or other facility with appropriate resources  Outcome: Progressing  Flowsheets  Taken 1/15/2025 2019  Discharge to home or other facility with appropriate resources: Identify barriers to discharge with patient and caregiver  Taken 1/15/2025 2014  Discharge to home or other facility with appropriate resources: Identify barriers to discharge with patient and caregiver     Problem: Safety - Adult  Goal: Free from fall injury  Outcome: Progressing  Flowsheets (Taken 1/16/2025 0305)  Free From Fall Injury: Instruct family/caregiver on patient safety     Problem: ABCDS Injury Assessment  Goal: Absence of physical injury  Outcome: Progressing  Flowsheets (Taken 1/16/2025 0305)  Absence of Physical Injury: Implement safety measures based on patient assessment

## 2025-01-16 NOTE — PROGRESS NOTES
OT eval order received and acknowledged. Pt screened and demonstrating baseline independence for self care tasks and functional mobility/transfers. Pt reports no need for skilled OT services at this time. OT evaluation order will therefore be discontinued this pt has no acute OT needs. Please reorder OT if pt's functional status changes. Thank you.         Functional Measure:  [facility] AM-PACTM \"6 Clicks\"                                                       Daily Activity Inpatient Short Form  How much help from another person does the patient currently need... Total; A Lot A Little None   1.  Putting on and taking off regular lower body clothing? []  1 []  2 []  3 [x]  4   2.  Bathing (including washing, rinsing, drying)? []  1 []  2 []  3 [x]  4   3.  Toileting, which includes using toilet, bedpan or urinal? [] 1 []  2 []  3 [x]  4   4.  Putting on and taking off regular upper body clothing? []  1 []  2 []  3 [x]  4   5.  Taking care of personal grooming such as brushing teeth? []  1 []  2 []  3 [x]  4   6.  Eating meals? []  1 []  2 []  3 [x]  4   © 2007, Trustees of [facility], under license to [employer]. All rights reserved     Score: 24/24     Interpretation of Tool:  Represents clinically-significant functional categories (i.e. Activities of daily living).  Percentage of Impairment CH    0%   CI    1-19% CJ    20-39% CK    40-59% CL    60-79% CM    80-99% CN     100%   [facility]  Score 6-24 24 23 20-22 15-19 10-14 7-9 6

## 2025-01-16 NOTE — PROGRESS NOTES
Hospitalist Progress Note               Daily Progress Note: 1/16/2025      Hospital Day: 2     Chief complaint:   Chief Complaint   Patient presents with    Tachycardia    Abdominal Pain        Subjective:   Hospital course to date:  85-year-old female with PMH significant for restless leg syndrome, asthma, bronchiectasis, peripheral neuropathy presented to ED with complaints of shortness of breath, cough and constipation.  For her chronic pulmonary problems she follows with [facility].  She tested positive for flu, CAT scan negative for PE but revealed a large mediastinal lymph node and emphysema.  Pulmonology on board.    1/16 patient states that overall she is doing much better with oxygen, reports improvement in her symptoms.  Was recently  Evaluated by pulmonology on outpatient basis.    Medications reviewed  Current Facility-Administered Medications   Medication Dose Route Frequency    methylPREDNISolone sodium succ (SOLU-MEDROL) injection 40 mg  40 mg IntraVENous Q8H    ipratropium 0.5 mg-albuterol 2.5 mg (DUONEB) nebulizer solution 1 Dose  1 Dose Inhalation Q6H WA RT    [START ON 1/17/2025] levoFLOXacin (LEVAQUIN) 250 MG/50ML infusion 250 mg  250 mg IntraVENous Q24H    oseltamivir (TAMIFLU) capsule 30 mg  30 mg Oral BID    sodium chloride flush 0.9 % injection 5-40 mL  5-40 mL IntraVENous 2 times per day    sodium chloride flush 0.9 % injection 5-40 mL  5-40 mL IntraVENous PRN    0.9 % sodium chloride infusion   IntraVENous PRN    potassium chloride (KLOR-CON M) extended release tablet 40 mEq  40 mEq Oral PRN    Or    potassium bicarb-citric acid (EFFER-K) effervescent tablet 40 mEq  40 mEq Oral PRN    Or    potassium chloride 10 mEq/100 mL IVPB (Peripheral Line)  10 mEq IntraVENous PRN    magnesium sulfate 2000 mg in 50 mL IVPB premix  2,000 mg IntraVENous PRN    enoxaparin (LOVENOX) injection 40 mg  40 mg SubCUTAneous Daily    ondansetron (ZOFRAN-ODT) disintegrating tablet 4 mg   Illness/injury posing threat to life or bodily function:    [] Severe exacerbation of chronic illness:    ---------------------------------------------------------------------  B. Risk of Treatment (any 1)   [] Drugs/treatments that require intensive monitoring for toxicity include:    [] IV ABX requiring serial renal monitoring for nephrotoxicity:     [] IV Narcotic analgesia for adverse drug reaction  [] Aggressive IV diuresis requiring serial monitoring for renal impairment and electrolyte derangements  [] Critical electrolyte abnormalities requiring IV replacement and close serial monitoring  [] SQ Insulin SS- monitoring serial FSBS for Hypoglycemic adverse drug reaction  [] Other -   [] Change in code status:    [] Decision to escalate care:    [] Major surgery/procedure with associated risk factors:    ----------------------------------------------------------------------  C. Data (any 2)  [x] Discussed current management and discharge planning options with Case Management.  [] Discussed management of the case with:    [] Telemetry personally reviewed and interpreted as documented above    [] Imaging personally reviewed and interpreted, includes:    [x] Data Review (any 3)  [x] All available Consultant notes from yesterday/today were reviewed  [x] All current labs were reviewed and interpreted for clinical significance   [x] Appropriate follow-up labs were ordered  [] Collateral history obtained from:               Assessment and Plan:    Acute hypoxemic respiratory failure 2/2 influenza A  History of asthma and bronchiectasis  CTA chest unremarkable for PE, mildly enlarged mediastinal lymph node, minimal emphysema  Influenza A positive  On breathing treatment  IV steroids  Tamiflu  Pulmonology on board  On Levaquin  Follow-up culture inflammatory marker    Chronic medical conditions  Hypertension  Peripheral neuropathy  Restless leg syndrome  Appropriate home meds resumed      Teresita Amaya MD

## 2025-01-16 NOTE — PLAN OF CARE
PHYSICAL THERAPY EVALUATION AND DISCHARGE  Patient: Sue Alberto (85 y.o. female)  Date: 1/16/2025  Primary Diagnosis: Septicemia (HCC) [A41.9]  Influenza A [J10.1]  Acute hypoxic respiratory failure [J96.01]       Precautions: Fall Risk, Other (Comment) (Droplet)                      Recommendations for nursing mobility: Amb to bathroom with AD and gait belt    In place during session: Peripheral IV and EKG/telemetry     ASSESSMENT  Pt is a 85 y.o. female admitted on 1/15/2025 for hypoxia; pt currently being treated for flu . Pt semi supine upon PT arrival, agreeable to evaluation. Pt A&O x 4.     Based on the objective data described below pt currently present at baseline indep for transfers and mobility at this time.Patient sitting in the recliner, able to stand with sup ,ambulated to the bathroom and performed self care indep,ambulated in the room indep with O2.Patient  does not use O2 at home,uses nebulizer and BiPAP. (See below for objective details and assist levels).     Overall pt tolerated session good today with PT.  Pt has no skilled acute PT needs at this time noted by PT or reported by pt, will DC skilled PT following evaluation; pt verbalized understanding and agreement.  Current PT DC recommendation No skilled physical therapy recommended at this time,    once medically appropriate.         PLAN :  Recommendations and Planned Interventions: DC from skilled PT services following evaluation.     Rationale for discharge: Patient currently at functional baseline for transfers/mobility, no further skilled therapy required at this time    Frequency/Duration: DC from services following evaluation due to pt being at functional baseline; no skilled therapy required during admission, please reorder if needed     Recommendation for discharge: (in order for the patient to meet his/her long term goals)  No skilled physical therapy recommended at this time,        Potential barriers for safe discharge: pt  Safety awareness training;Weight shifting training/pressure relief  Sit to Stand: Stand-by assistance  Stand to Sit: Stand-by assistance  Balance:               Balance  Sitting: Intact  Standing: Intact  Wheelchair Mobility:        Ambulation/Gait Training:                                Gait  Gait Training: Yes  Overall Level of Assistance: Stand-by assistance;Supervision  Distance (ft): 50 Feet  Assistive Device: Other (comment) (O2.)  Interventions: Safety awareness training                   Therapeutic Intervention provided:   bed mobility , EOB transfers, OOB transfers, amb without AD, LE therapeutic exercises, seated dynamic balance, and standing dynamic balance    Functional Measure:  [facility] AM-PAC™ “6 Clicks”         Basic Mobility Inpatient Short Form  How much difficulty does the patient currently have... Unable A Lot A Little None   1.  Turning over in bed (including adjusting bedclothes, sheets and blankets)?   [] 1   [] 2   [] 3   [x] 4   2.  Sitting down on and standing up from a chair with arms ( e.g., wheelchair, bedside commode, etc.)   [] 1   [] 2   [] 3   [x] 4   3.  Moving from lying on back to sitting on the side of the bed?   [] 1   [] 2   [] 3   [x] 4          How much help from another person does the patient currently need... Total A Lot A Little None   4.  Moving to and from a bed to a chair (including a wheelchair)?   [] 1   [] 2   [] 3   [x] 4   5.  Need to walk in hospital room?   [] 1   [] 2   [] 3   [x] 4   6.  Climbing 3-5 steps with a railing?   [] 1   [] 2   [] 3   [x] 4   © 2007, Trustees of [facility], under license to [employer]. All rights reserved     Score:  Initial: 24/24 Most Recent: X (Date: 1/16/2025)   Interpretation of Tool:  Represents activities that are increasingly more difficult (i.e. Bed mobility, Transfers, Gait).  Score 24 23 22-20 19-15 14-10 9-7 6   Modifier CH CI CJ CK CL CM CN          Physical Therapy Evaluation Charge Determination

## 2025-01-16 NOTE — H&P
Hypoglycemic adverse drug reaction  [] Other -   [] Change in code status:    [] Decision to escalate care:    [] Major surgery/procedure with associated risk factors:    ----------------------------------------------------------------------  C. Data (any 2)  [] Discussed current management and discharge planning options with Case Management.  [x] Discussed management of the case with: ER provider, pulmonary medicine  [] Telemetry personally reviewed and interpreted as documented above    [x] Imaging personally reviewed and interpreted, includes:    [] Data Review (any 3)  [] All available Consultant notes from yesterday/today were reviewed  [] All current labs were reviewed and interpreted for clinical significance   [] Appropriate follow-up labs were ordered  [] Collateral history obtained from:

## 2025-01-16 NOTE — PROGRESS NOTES
4 Eyes Skin Assessment     NAME:  Sue Alberto  YOB: 1940  MEDICAL RECORD NUMBER:  [id number]    The patient is being assessed for  Admission    I agree that at least one RN has performed a thorough Head to Toe Skin Assessment on the patient. ALL assessment sites listed below have been assessed.      Areas assessed by both nurses:    Head, Face, Ears, Shoulders, Back, Chest, Arms, Elbows, Hands, Sacrum. Buttock, Coccyx, Ischium, and Legs. Feet and Heels        Does the Patient have a Wound? No noted wound(s)       Ovi Prevention initiated by RN: Yes  Wound Care Orders initiated by RN: No    Pressure Injury (Stage 3,4, Unstageable, DTI, NWPT, and Complex wounds) if present, place Wound referral order by RN under : No    New Ostomies, if present place, Ostomy referral order under : No     Nurse 1 eSignature: Electronically signed by Jenny Palacio RN on 1/16/25 at 3:05 AM EST    **SHARE this note so that the co-signing nurse can place an eSignature**    Nurse 2 eSignature: Electronically signed by Salima Hodges RN on 1/16/25 at 3:11 AM EST

## 2025-01-17 LAB
ANION GAP SERPL CALC-SCNC: 5 MMOL/L (ref 2–12)
BASOPHILS # BLD: 0 K/UL (ref 0–0.1)
BASOPHILS NFR BLD: 0 % (ref 0–1)
BUN SERPL-MCNC: 14 MG/DL (ref 6–20)
BUN/CREAT SERPL: 21 (ref 12–20)
CA-I BLD-MCNC: 9.4 MG/DL (ref 8.5–10.1)
CHLORIDE SERPL-SCNC: 105 MMOL/L (ref 97–108)
CO2 SERPL-SCNC: 31 MMOL/L (ref 21–32)
CREAT SERPL-MCNC: 0.68 MG/DL (ref 0.55–1.02)
DIFFERENTIAL METHOD BLD: ABNORMAL
EOSINOPHIL # BLD: 0 K/UL (ref 0–0.4)
EOSINOPHIL NFR BLD: 0 % (ref 0–7)
ERYTHROCYTE [DISTWIDTH] IN BLOOD BY AUTOMATED COUNT: 15 % (ref 11.5–14.5)
GLUCOSE SERPL-MCNC: 156 MG/DL (ref 65–100)
HCT VFR BLD AUTO: 31.3 % (ref 35–47)
HGB BLD-MCNC: 9.9 G/DL (ref 11.5–16)
IMM GRANULOCYTES # BLD AUTO: 0.06 K/UL (ref 0–0.04)
IMM GRANULOCYTES NFR BLD AUTO: 0.7 % (ref 0–0.5)
LYMPHOCYTES # BLD: 0.86 K/UL (ref 0.8–3.5)
LYMPHOCYTES NFR BLD: 9.8 % (ref 12–49)
MAGNESIUM SERPL-MCNC: 2 MG/DL (ref 1.6–2.4)
MCH RBC QN AUTO: 30.7 PG (ref 26–34)
MCHC RBC AUTO-ENTMCNC: 31.6 G/DL (ref 30–36.5)
MCV RBC AUTO: 96.9 FL (ref 80–99)
MONOCYTES # BLD: 0.31 K/UL (ref 0–1)
MONOCYTES NFR BLD: 3.5 % (ref 5–13)
NEUTS SEG # BLD: 7.55 K/UL (ref 1.8–8)
NEUTS SEG NFR BLD: 86 % (ref 32–75)
NRBC # BLD: 0 K/UL (ref 0–0.01)
NRBC BLD-RTO: 0 PER 100 WBC
PHOSPHATE SERPL-MCNC: 2.6 MG/DL (ref 2.6–4.7)
PLATELET # BLD AUTO: 155 K/UL (ref 150–400)
PMV BLD AUTO: 10.5 FL (ref 8.9–12.9)
POTASSIUM SERPL-SCNC: 3.5 MMOL/L (ref 3.5–5.1)
RBC # BLD AUTO: 3.23 M/UL (ref 3.8–5.2)
SODIUM SERPL-SCNC: 141 MMOL/L (ref 136–145)
WBC # BLD AUTO: 8.8 K/UL (ref 3.6–11)

## 2025-01-17 PROCEDURE — 94761 N-INVAS EAR/PLS OXIMETRY MLT: CPT

## 2025-01-17 PROCEDURE — 36415 COLL VENOUS BLD VENIPUNCTURE: CPT

## 2025-01-17 PROCEDURE — 87070 CULTURE OTHR SPECIMN AEROBIC: CPT

## 2025-01-17 PROCEDURE — 6370000000 HC RX 637 (ALT 250 FOR IP): Performed by: STUDENT IN AN ORGANIZED HEALTH CARE EDUCATION/TRAINING PROGRAM

## 2025-01-17 PROCEDURE — 6360000002 HC RX W HCPCS: Performed by: INTERNAL MEDICINE

## 2025-01-17 PROCEDURE — 6370000000 HC RX 637 (ALT 250 FOR IP): Performed by: EMERGENCY MEDICINE

## 2025-01-17 PROCEDURE — 6370000000 HC RX 637 (ALT 250 FOR IP): Performed by: INTERNAL MEDICINE

## 2025-01-17 PROCEDURE — 80048 BASIC METABOLIC PNL TOTAL CA: CPT

## 2025-01-17 PROCEDURE — 84100 ASSAY OF PHOSPHORUS: CPT

## 2025-01-17 PROCEDURE — 94668 MNPJ CHEST WALL SBSQ: CPT

## 2025-01-17 PROCEDURE — 83735 ASSAY OF MAGNESIUM: CPT

## 2025-01-17 PROCEDURE — 87205 SMEAR GRAM STAIN: CPT

## 2025-01-17 PROCEDURE — 6370000000 HC RX 637 (ALT 250 FOR IP)

## 2025-01-17 PROCEDURE — 1100000000 HC RM PRIVATE

## 2025-01-17 PROCEDURE — 94640 AIRWAY INHALATION TREATMENT: CPT

## 2025-01-17 PROCEDURE — 85025 COMPLETE CBC W/AUTO DIFF WBC: CPT

## 2025-01-17 RX ORDER — LACTULOSE 10 G/15ML
20 SOLUTION ORAL ONCE
Status: COMPLETED | OUTPATIENT
Start: 2025-01-17 | End: 2025-01-17

## 2025-01-17 RX ORDER — LEVOFLOXACIN 500 MG/1
500 TABLET, FILM COATED ORAL DAILY
Status: DISCONTINUED | OUTPATIENT
Start: 2025-01-17 | End: 2025-01-18 | Stop reason: HOSPADM

## 2025-01-17 RX ORDER — HYDRALAZINE HYDROCHLORIDE 20 MG/ML
5 INJECTION INTRAMUSCULAR; INTRAVENOUS EVERY 6 HOURS PRN
Status: DISCONTINUED | OUTPATIENT
Start: 2025-01-17 | End: 2025-01-18 | Stop reason: HOSPADM

## 2025-01-17 RX ORDER — VENLAFAXINE HYDROCHLORIDE 75 MG/1
150 CAPSULE, EXTENDED RELEASE ORAL
Status: DISCONTINUED | OUTPATIENT
Start: 2025-01-17 | End: 2025-01-18 | Stop reason: HOSPADM

## 2025-01-17 RX ORDER — ACETYLCYSTEINE 200 MG/ML
600 SOLUTION ORAL; RESPIRATORY (INHALATION)
Status: DISCONTINUED | OUTPATIENT
Start: 2025-01-17 | End: 2025-01-18

## 2025-01-17 RX ADMIN — LEVOFLOXACIN 500 MG: 500 TABLET, FILM COATED ORAL at 13:33

## 2025-01-17 RX ADMIN — GABAPENTIN 100 MG: 100 CAPSULE ORAL at 10:11

## 2025-01-17 RX ADMIN — VENLAFAXINE HYDROCHLORIDE 150 MG: 75 CAPSULE, EXTENDED RELEASE ORAL at 10:10

## 2025-01-17 RX ADMIN — FLUTICASONE PROPIONATE 2 SPRAY: 50 SPRAY, METERED NASAL at 10:13

## 2025-01-17 RX ADMIN — LACTULOSE 20 G: 20 SOLUTION ORAL at 14:38

## 2025-01-17 RX ADMIN — ROPINIROLE HYDROCHLORIDE 0.5 MG: 0.25 TABLET, FILM COATED ORAL at 10:10

## 2025-01-17 RX ADMIN — LACTULOSE 20 G: 20 SOLUTION ORAL at 19:39

## 2025-01-17 RX ADMIN — FLUTICASONE PROPIONATE 2 SPRAY: 50 SPRAY, METERED NASAL at 21:18

## 2025-01-17 RX ADMIN — IPRATROPIUM BROMIDE AND ALBUTEROL SULFATE 1 DOSE: 2.5; .5 SOLUTION RESPIRATORY (INHALATION) at 20:39

## 2025-01-17 RX ADMIN — ROPINIROLE HYDROCHLORIDE 1.5 MG: 1 TABLET, FILM COATED ORAL at 21:16

## 2025-01-17 RX ADMIN — SALINE NASAL SPRAY 1 SPRAY: 1.5 SOLUTION NASAL at 10:14

## 2025-01-17 RX ADMIN — ENOXAPARIN SODIUM 40 MG: 100 INJECTION SUBCUTANEOUS at 10:11

## 2025-01-17 RX ADMIN — ACETYLCYSTEINE 600 MG: 200 SOLUTION ORAL; RESPIRATORY (INHALATION) at 13:40

## 2025-01-17 RX ADMIN — TRAZODONE HYDROCHLORIDE 150 MG: 150 TABLET ORAL at 21:20

## 2025-01-17 RX ADMIN — ASPIRIN 81 MG CHEWABLE TABLET 81 MG: 81 TABLET CHEWABLE at 21:15

## 2025-01-17 RX ADMIN — ACETYLCYSTEINE 600 MG: 200 SOLUTION ORAL; RESPIRATORY (INHALATION) at 20:39

## 2025-01-17 RX ADMIN — PANTOPRAZOLE SODIUM 40 MG: 40 TABLET, DELAYED RELEASE ORAL at 10:11

## 2025-01-17 RX ADMIN — POTASSIUM CHLORIDE 20 MEQ: 1500 TABLET, EXTENDED RELEASE ORAL at 10:11

## 2025-01-17 RX ADMIN — ALLOPURINOL 150 MG: 300 TABLET ORAL at 10:11

## 2025-01-17 RX ADMIN — ASPIRIN 81 MG CHEWABLE TABLET 81 MG: 81 TABLET CHEWABLE at 10:10

## 2025-01-17 RX ADMIN — IPRATROPIUM BROMIDE AND ALBUTEROL SULFATE 1 DOSE: 2.5; .5 SOLUTION RESPIRATORY (INHALATION) at 13:40

## 2025-01-17 RX ADMIN — BUDESONIDE INHALATION 500 MCG: 0.5 SUSPENSION RESPIRATORY (INHALATION) at 20:46

## 2025-01-17 RX ADMIN — OSELTAMIVIR PHOSPHATE 30 MG: 30 CAPSULE ORAL at 10:11

## 2025-01-17 RX ADMIN — OSELTAMIVIR PHOSPHATE 30 MG: 30 CAPSULE ORAL at 21:15

## 2025-01-17 RX ADMIN — POTASSIUM CHLORIDE 20 MEQ: 1500 TABLET, EXTENDED RELEASE ORAL at 21:15

## 2025-01-17 RX ADMIN — MONTELUKAST 10 MG: 10 TABLET, FILM COATED ORAL at 10:10

## 2025-01-17 RX ADMIN — LOSARTAN POTASSIUM 100 MG: 50 TABLET, FILM COATED ORAL at 10:11

## 2025-01-17 RX ADMIN — IPRATROPIUM BROMIDE AND ALBUTEROL SULFATE 1 DOSE: 2.5; .5 SOLUTION RESPIRATORY (INHALATION) at 08:31

## 2025-01-17 RX ADMIN — BUDESONIDE INHALATION 500 MCG: 0.5 SUSPENSION RESPIRATORY (INHALATION) at 08:31

## 2025-01-17 ASSESSMENT — PAIN SCALES - GENERAL
PAINLEVEL_OUTOF10: 0
PAINLEVEL_OUTOF10: 0

## 2025-01-17 NOTE — PLAN OF CARE
Problem: Discharge Planning  Goal: Discharge to home or other facility with appropriate resources  1/16/2025 2303 by Sp Jeff RN  Outcome: Progressing  1/16/2025 1127 by Rylie Downey RN  Outcome: Progressing  Flowsheets (Taken 1/16/2025 0846)  Discharge to home or other facility with appropriate resources:   Identify barriers to discharge with patient and caregiver   Arrange for needed discharge resources and transportation as appropriate   Identify discharge learning needs (meds, wound care, etc)     Problem: Safety - Adult  Goal: Free from fall injury  1/16/2025 2303 by Sp Jeff RN  Outcome: Progressing  1/16/2025 1127 by Rylie Downey RN  Outcome: Progressing     Problem: ABCDS Injury Assessment  Goal: Absence of physical injury  1/16/2025 2303 by Sp Jeff RN  Outcome: Progressing  1/16/2025 1127 by Rylie Downey RN  Outcome: Progressing

## 2025-01-17 NOTE — PROGRESS NOTES
Hospitalist Progress Note               Daily Progress Note: 1/17/2025      Hospital Day: 3     Chief complaint:   Chief Complaint   Patient presents with    Tachycardia    Abdominal Pain        Subjective:   Hospital course to date:  85-year-old female with PMH significant for restless leg syndrome, asthma, bronchiectasis, peripheral neuropathy presented to ED with complaints of shortness of breath, cough and constipation.  For her chronic pulmonary problems she follows with [facility].  She tested positive for flu, CAT scan negative for PE but revealed a large mediastinal lymph node and emphysema.  Pulmonology on board.    1/16 patient states that overall she is doing much better with oxygen, reports improvement in her symptoms.  Was recently  Evaluated by pulmonology on outpatient basis.    1/17 patient reports significant improvement, currently on room air.  Does not report shortness of breath on minimal exertion, assessed with bedside RN.  Patient has not yet had a bowel movement, administered MiraLAX and lactulose, continue to monitor, patient can be discharged once she has a bowel movement.  Likely tomorrow.    Medications reviewed  Current Facility-Administered Medications   Medication Dose Route Frequency    venlafaxine (EFFEXOR XR) extended release capsule 150 mg  150 mg Oral Daily with breakfast    hydrALAZINE (APRESOLINE) injection 5 mg  5 mg IntraVENous Q6H PRN    acetylcysteine (MUCOMYST) 20 % solution 600 mg  600 mg Inhalation BID RT    levoFLOXacin (LEVAQUIN) tablet 500 mg  500 mg Oral Daily    ipratropium 0.5 mg-albuterol 2.5 mg (DUONEB) nebulizer solution 1 Dose  1 Dose Inhalation Q6H WA RT    sodium chloride (OCEAN, BABY AYR) 0.65 % nasal spray 1 spray  1 spray Each Nostril Q2H PRN    losartan (COZAAR) tablet 100 mg  100 mg Oral Daily    oseltamivir (TAMIFLU) capsule 30 mg  30 mg Oral BID    sodium chloride flush 0.9 % injection 5-40 mL  5-40 mL IntraVENous 2 times per  (SINGLE AP VIEW)   Final Result   Nonobstructive bowel gas pattern.      Electronically signed by Wilian Valdez             Discussion/MDM:     [] High (any 2)    A. Problems (any 1)  [] Acute/Chronic Illness/injury posing threat to life or bodily function:    [] Severe exacerbation of chronic illness:    ---------------------------------------------------------------------  B. Risk of Treatment (any 1)   [] Drugs/treatments that require intensive monitoring for toxicity include:    [] IV ABX requiring serial renal monitoring for nephrotoxicity:     [] IV Narcotic analgesia for adverse drug reaction  [] Aggressive IV diuresis requiring serial monitoring for renal impairment and electrolyte derangements  [] Critical electrolyte abnormalities requiring IV replacement and close serial monitoring  [] SQ Insulin SS- monitoring serial FSBS for Hypoglycemic adverse drug reaction  [] Other -   [] Change in code status:    [] Decision to escalate care:    [] Major surgery/procedure with associated risk factors:    ----------------------------------------------------------------------  C. Data (any 2)  [x] Discussed current management and discharge planning options with Case Management.  [] Discussed management of the case with:    [] Telemetry personally reviewed and interpreted as documented above    [] Imaging personally reviewed and interpreted, includes:    [x] Data Review (any 3)  [x] All available Consultant notes from yesterday/today were reviewed  [x] All current labs were reviewed and interpreted for clinical significance   [x] Appropriate follow-up labs were ordered  [] Collateral history obtained from:               Assessment and Plan:    Acute hypoxemic respiratory failure 2/2 influenza A, resolved  History of asthma and bronchiectasis  CTA chest unremarkable for PE, mildly enlarged mediastinal lymph node, minimal emphysema  Influenza A positive  On breathing treatment  IV steroids  Tamiflu  Pulmonology on

## 2025-01-17 NOTE — PROGRESS NOTES
1.5 mg at 250    rOPINIRole (REQUIP) tablet 0.5 mg  0.5 mg Oral Nikolai Price PA-C   0.5 mg at 25 1010        MAR reviewed and pertinent medications noted or modified as needed      ROS:Pertinent items are noted in HPI.      Hemodynamics:    CO:    CI:    CVP:    SVR:   PAP Systolic:    PAP Diastolic:    PVR:    SV02:        Ventilator Settings:      Mode Rate TV Press PEEP FiO2 PIP Min. Vent               21 %              Vital Signs: Telemetry:    normal sinus rhythm Intake/Output:   BP (!) 174/83   Pulse 90   Temp 98.4 °F (36.9 °C) (Oral)   Resp 18   Ht 1.524 m (5')   Wt 68 kg (150 lb)   SpO2 93%   BMI 29.29 kg/m²     Temp (24hrs), Av.3 °F (36.8 °C), Min:97.9 °F (36.6 °C), Max:98.8 °F (37.1 °C)        O2 Device: None (Room air) O2 Flow Rate (L/min): 1 L/min       Wt Readings from Last 4 Encounters:   01/15/25 68 kg (150 lb)   24 70.3 kg (155 lb)   24 68 kg (150 lb)          Intake/Output Summary (Last 24 hours) at 2025 1016  Last data filed at 2025 2239  Gross per 24 hour   Intake 240 ml   Output 1850 ml   Net -1610 ml       Last shift:      No intake/output data recorded.  Last 3 shifts: 01/15 1901 -  0700  In: 440 [P.O.:440]  Out: 1850 [Urine:1850]       Physical Exam:     General: Alert awake short of breath cough with productive sputum  HEENT: NCAT, poor dentition, lips and mucosa dry  Eyes: anicteric; conjunctiva clear  Neck: no nodes, trach midline; no accessory MM use.  Chest: no deformity,   Cardiac: R regular; no murmur;   Lungs: distant breath sounds; expiratory wheezes  Abd: soft, NT, hypoactive BS  Ext: no edema; no joint swelling; No clubbing  : NO ba, clear urine  Neuro: No focal neurological deficit  Psych- no agitation, oriented to person;   Skin: warm, dry, no cyanosis;   Pulses: 1-2+ Bilateral pedal, radial  Capillary: brisk; pale      DATA:  No results found for this or any previous visit.    No results found for this or any  the nursing staff throughout this time.

## 2025-01-18 VITALS
BODY MASS INDEX: 29.45 KG/M2 | HEART RATE: 84 BPM | OXYGEN SATURATION: 95 % | DIASTOLIC BLOOD PRESSURE: 111 MMHG | TEMPERATURE: 98.6 F | WEIGHT: 150 LBS | SYSTOLIC BLOOD PRESSURE: 147 MMHG | HEIGHT: 60 IN | RESPIRATION RATE: 18 BRPM

## 2025-01-18 LAB
ANION GAP SERPL CALC-SCNC: 4 MMOL/L (ref 2–12)
BACTERIA SPEC CULT: NORMAL
BASOPHILS # BLD: 0.01 K/UL (ref 0–0.1)
BASOPHILS NFR BLD: 0.1 % (ref 0–1)
BUN SERPL-MCNC: 16 MG/DL (ref 6–20)
BUN/CREAT SERPL: 17 (ref 12–20)
CA-I BLD-MCNC: 9.3 MG/DL (ref 8.5–10.1)
CHLORIDE SERPL-SCNC: 106 MMOL/L (ref 97–108)
CO2 SERPL-SCNC: 31 MMOL/L (ref 21–32)
CREAT SERPL-MCNC: 0.92 MG/DL (ref 0.55–1.02)
DIFFERENTIAL METHOD BLD: ABNORMAL
EOSINOPHIL # BLD: 0.01 K/UL (ref 0–0.4)
EOSINOPHIL NFR BLD: 0.1 % (ref 0–7)
ERYTHROCYTE [DISTWIDTH] IN BLOOD BY AUTOMATED COUNT: 15.4 % (ref 11.5–14.5)
GLUCOSE SERPL-MCNC: 93 MG/DL (ref 65–100)
GRAM STN SPEC: NORMAL
HCT VFR BLD AUTO: 32.1 % (ref 35–47)
HGB BLD-MCNC: 10.2 G/DL (ref 11.5–16)
IMM GRANULOCYTES # BLD AUTO: 0.07 K/UL (ref 0–0.04)
IMM GRANULOCYTES NFR BLD AUTO: 0.9 % (ref 0–0.5)
LYMPHOCYTES # BLD: 1.59 K/UL (ref 0.8–3.5)
LYMPHOCYTES NFR BLD: 19.9 % (ref 12–49)
Lab: NORMAL
MAGNESIUM SERPL-MCNC: 2.1 MG/DL (ref 1.6–2.4)
MCH RBC QN AUTO: 31.3 PG (ref 26–34)
MCHC RBC AUTO-ENTMCNC: 31.8 G/DL (ref 30–36.5)
MCV RBC AUTO: 98.5 FL (ref 80–99)
MONOCYTES # BLD: 0.41 K/UL (ref 0–1)
MONOCYTES NFR BLD: 5.1 % (ref 5–13)
NEUTS SEG # BLD: 5.89 K/UL (ref 1.8–8)
NEUTS SEG NFR BLD: 73.9 % (ref 32–75)
NRBC # BLD: 0 K/UL (ref 0–0.01)
NRBC BLD-RTO: 0 PER 100 WBC
PHOSPHATE SERPL-MCNC: 3.1 MG/DL (ref 2.6–4.7)
PLATELET # BLD AUTO: 184 K/UL (ref 150–400)
PMV BLD AUTO: 10.5 FL (ref 8.9–12.9)
POTASSIUM SERPL-SCNC: 3.5 MMOL/L (ref 3.5–5.1)
RBC # BLD AUTO: 3.26 M/UL (ref 3.8–5.2)
SODIUM SERPL-SCNC: 141 MMOL/L (ref 136–145)
WBC # BLD AUTO: 8 K/UL (ref 3.6–11)

## 2025-01-18 PROCEDURE — 94640 AIRWAY INHALATION TREATMENT: CPT

## 2025-01-18 PROCEDURE — 6370000000 HC RX 637 (ALT 250 FOR IP): Performed by: INTERNAL MEDICINE

## 2025-01-18 PROCEDURE — 6360000002 HC RX W HCPCS: Performed by: INTERNAL MEDICINE

## 2025-01-18 PROCEDURE — 83735 ASSAY OF MAGNESIUM: CPT

## 2025-01-18 PROCEDURE — 94761 N-INVAS EAR/PLS OXIMETRY MLT: CPT

## 2025-01-18 PROCEDURE — 84100 ASSAY OF PHOSPHORUS: CPT

## 2025-01-18 PROCEDURE — 6370000000 HC RX 637 (ALT 250 FOR IP): Performed by: STUDENT IN AN ORGANIZED HEALTH CARE EDUCATION/TRAINING PROGRAM

## 2025-01-18 PROCEDURE — 6370000000 HC RX 637 (ALT 250 FOR IP): Performed by: EMERGENCY MEDICINE

## 2025-01-18 PROCEDURE — 85025 COMPLETE CBC W/AUTO DIFF WBC: CPT

## 2025-01-18 PROCEDURE — 80048 BASIC METABOLIC PNL TOTAL CA: CPT

## 2025-01-18 PROCEDURE — 36415 COLL VENOUS BLD VENIPUNCTURE: CPT

## 2025-01-18 PROCEDURE — 6370000000 HC RX 637 (ALT 250 FOR IP)

## 2025-01-18 RX ORDER — LEVOFLOXACIN 500 MG/1
500 TABLET, FILM COATED ORAL DAILY
Qty: 5 TABLET | Refills: 0 | Status: SHIPPED | OUTPATIENT
Start: 2025-01-19 | End: 2025-01-24

## 2025-01-18 RX ADMIN — FLUTICASONE PROPIONATE 2 SPRAY: 50 SPRAY, METERED NASAL at 10:18

## 2025-01-18 RX ADMIN — OSELTAMIVIR PHOSPHATE 30 MG: 30 CAPSULE ORAL at 10:10

## 2025-01-18 RX ADMIN — IPRATROPIUM BROMIDE AND ALBUTEROL SULFATE 1 DOSE: 2.5; .5 SOLUTION RESPIRATORY (INHALATION) at 09:46

## 2025-01-18 RX ADMIN — ACETYLCYSTEINE 600 MG: 200 SOLUTION ORAL; RESPIRATORY (INHALATION) at 09:46

## 2025-01-18 RX ADMIN — ALLOPURINOL 150 MG: 300 TABLET ORAL at 10:10

## 2025-01-18 RX ADMIN — VENLAFAXINE HYDROCHLORIDE 150 MG: 75 CAPSULE, EXTENDED RELEASE ORAL at 10:10

## 2025-01-18 RX ADMIN — LEVOFLOXACIN 500 MG: 500 TABLET, FILM COATED ORAL at 10:10

## 2025-01-18 RX ADMIN — ENOXAPARIN SODIUM 40 MG: 100 INJECTION SUBCUTANEOUS at 10:13

## 2025-01-18 RX ADMIN — ROPINIROLE HYDROCHLORIDE 0.5 MG: 0.25 TABLET, FILM COATED ORAL at 10:09

## 2025-01-18 RX ADMIN — PANTOPRAZOLE SODIUM 40 MG: 40 TABLET, DELAYED RELEASE ORAL at 10:10

## 2025-01-18 RX ADMIN — POTASSIUM CHLORIDE 20 MEQ: 1500 TABLET, EXTENDED RELEASE ORAL at 10:09

## 2025-01-18 RX ADMIN — ASPIRIN 81 MG CHEWABLE TABLET 81 MG: 81 TABLET CHEWABLE at 10:10

## 2025-01-18 RX ADMIN — BUDESONIDE INHALATION 500 MCG: 0.5 SUSPENSION RESPIRATORY (INHALATION) at 09:46

## 2025-01-18 RX ADMIN — MONTELUKAST 10 MG: 10 TABLET, FILM COATED ORAL at 10:09

## 2025-01-18 RX ADMIN — LOSARTAN POTASSIUM 100 MG: 50 TABLET, FILM COATED ORAL at 10:10

## 2025-01-18 RX ADMIN — GABAPENTIN 100 MG: 100 CAPSULE ORAL at 10:10

## 2025-01-18 ASSESSMENT — PAIN SCALES - GENERAL: PAINLEVEL_OUTOF10: 0

## 2025-01-18 NOTE — PROGRESS NOTES
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  Chronic pain  Gastroesophageal reflux disease      RECOMMENDATIONS/PLAN:   85-year-old lady came in because of shortness of breath cough influenza A positive  Patient is on Tamiflu for influenza A  On Levaquin  History of bronchiectasis patient on nebulizer treatment vest therapy  On prednisone received 2 doses of Solu-Medrol  CT chest showed adenopathy no pulmonary embolism     1/17 alert awake feeling better using the nebulizer treatment along with vest therapy on oxygen nasal cannula will continue to wean oxygen per protocol this morning she was on room air, history of bronchiectasis remainder CAT scan of the chest did not show significant bronchiectasis but shows mediastinal adenopathy most likely reactive and mild emphysema but she is getting benefit from pulmonary toilet and vest therapy along with nebulizer treatment also add Mucomyst she follows with [facility] and she was seen twice by the group before coming to the hospital potassium has been corrected 3.5 today, PT OT out of bed to chair, discussed with her in detail about GERD diet as she is drinking soda and wants to drink lemonade most likely silent aspiration leading to bronchiectasis on Pulmicort via neb and also DuoNeb  1/18 feeling much comfortable shortness of breath cough much improved discontinue vest therapy discussed with her to follow-up with her pulmonologist and continue with the inhalers prescribed by her pulmonologist will discontinue Mucomyst on Pulmicort via neb she needs Levaquin only for 5 days including inpatient time discussed with the  at bedside continue with a GERD diet exercise pulmonary toilet deep breathing exercises as tolerated daily    [x] High complexity decision making was performed  [x] See my orders for details  HPI  85-year-old lady came in because of

## 2025-01-18 NOTE — CARE COORDINATION
Transition of Care Plan:    RUR: 12%  Prior Level of Functioning: independent  Disposition: home  JL: 1/18/25  If SNF or IPR: Date FOC offered: N/A  Date FOC received: N/A  Accepting facility: N/A  Date authorization started with reference number: N/A  Date authorization received and expires: N/A  Follow up appointments: Yes  DME needed: None  Transportation at discharge: TBD  IM/IMM Medicare/ letter given: Yes  Is patient a  and connected with [facility]? N/A   If yes, was [place] transfer form completed and [facility] notified?   Caregiver Contact: Family  Discharge Caregiver contacted prior to discharge?   Care Conference needed? No  Barriers to discharge: None

## 2025-01-18 NOTE — DISCHARGE SUMMARY
Physician Discharge Summary     Patient ID:    Sue Alberto  [id number]  85 y.o.  1940    Admit date: 1/15/2025    Discharge date : 1/18/2025      Final Diagnoses:   Septicemia (HCC) [A41.9]  Influenza A [J10.1]  Acute hypoxic respiratory failure [J96.01]    Reason for Hospitalization:  Same as above    Hospital Course:   85-year-old female with PMH significant for restless leg syndrome, asthma, bronchiectasis, peripheral neuropathy presented to ED with complaints of shortness of breath, cough and constipation.  Chronic pulmonary problems she follows with [facility].  In ED patient tested positive for flu, CAT scan negative for PE but revealed large mediastinal lymph node and emphysema for which patient is to follow-up with outpatient pulmonology([facility]).  Inpatient pulmonology was consulted.  Patient received ceftriaxone, transition to Levaquin(1/15 QTc 435).  On Tamiflu, steroids, breathing treatment.  Blood culture grossly unremarkable and respiratory culture revealed light normal respiratory cira.  Patient has been afebrile, transition to room air 1/17, denies dyspnea on exertion.  She reported constipation on 1/17, received MiraLAX and lactulose and was able to have 2 bowel movements.  Patient does report complaints of chronic constipation and difficulty of bowel movements for which she is advised to follow-up with outpatient gastroenterologist, referral information provided.  Patient is medically stable to be discharged at the moment pending outpatient follow-up with PCP and GI for further management.    Discharge Medications:      Medication List        START taking these medications      levoFLOXacin 500 MG tablet  Commonly known as: LEVAQUIN  Take 1 tablet by mouth daily for 5 doses  Start taking on: January 19, 2025            CONTINUE taking these medications      albuterol sulfate  (90 Base) MCG/ACT inhaler  Commonly known as:  32 mmol/L); Creatinine 0.76 mg/dL (Ref range: 0.55 - 1.02 mg/dL); Glucose 87 mg/dL (Ref range: 65 - 100 mg/dL); Hematocrit 31.0 % (L; Ref range: 35.0 - 47.0 %); Hemoglobin 9.4 g/dL (L; Ref range: 11.5 - 16.0 g/dL); Potassium 3.2 mmol/L (L; Ref range: 3.5 - 5.1 mmol/L); Sodium 140 mmol/L (Ref range: 136 - 145 mmol/L)  Recent Labs     01/17/25  0806 01/18/25  0955   WBC 8.8 8.0   HGB 9.9* 10.2*   HCT 31.3* 32.1*    184     Recent Labs     01/16/25  0841 01/17/25  0806 01/18/25  0955    141 141   K 3.2* 3.5 3.5    105 106   CO2 28 31 31   BUN 11 14 16   CREATININE 0.76 0.68 0.92   GLUCOSE 87 156* 93   CALCIUM 8.3* 9.4 9.3   MG  --  2.0 2.1   PHOS  --  2.6 3.1     No results for input(s): \"AST\", \"ALT\", \"ALKPHOS\", \"BILITOT\", \"LABALBU\", \"GLOB\", \"GGT\", \"AMYLASE\", \"LIPASE\" in the last 72 hours.    Invalid input(s): \"GPT\", \"PROT\"  No results for input(s): \"INR\", \"PROTIME\", \"APTT\" in the last 72 hours.   No results for input(s): \"IRON\", \"TIBC\" in the last 72 hours.    Invalid input(s): \"PSAT\", \"FERR\"   No results for input(s): \"PH\", \"PCO2\", \"PO2\" in the last 72 hours.  No results for input(s): \"CKTOTAL\", \"CKMB\", \"TROPONINI\" in the last 72 hours.  Lab Results   Component Value Date/Time    POCGLU 223 01/16/2025 06:22 PM    POCGLU 120 01/15/2025 08:09 PM    POCGLU 123 01/15/2025 11:28 AM     Discharge instructions  Please follow-up with PCP within 1 week of discharge and go over medication reconciliation  Please complete course of oral antibiotic  Please follow-up with GI within 1 week of discharge.    Discharge time spent 35 minutes    Signed:  Teresita Amaya MD  1/18/2025  1:47 PM

## 2025-01-18 NOTE — DISCHARGE INSTRUCTIONS
Please follow-up with PCP within 1 week of discharge and go over medication reconciliation  Please complete course of oral antibiotic  Please follow-up with GI within 1 week of discharge.

## 2025-01-18 NOTE — PROGRESS NOTES
Discharge plan of care/case management plan validated with provider discharge order.    Discharge instructions discussed with the patient and significant other. All concerns addressed at this time.    PIV removed.Patient wheeled downstairs to go home

## 2025-01-19 LAB
BACTERIA SPEC CULT: NORMAL
BACTERIA SPEC CULT: NORMAL
Lab: NORMAL
Lab: NORMAL

## 2025-01-21 LAB
BACTERIA SPEC CULT: NORMAL
BACTERIA SPEC CULT: NORMAL
Lab: NORMAL
Lab: NORMAL
